# Patient Record
Sex: FEMALE | Race: WHITE | Employment: PART TIME | URBAN - NONMETROPOLITAN AREA
[De-identification: names, ages, dates, MRNs, and addresses within clinical notes are randomized per-mention and may not be internally consistent; named-entity substitution may affect disease eponyms.]

---

## 2017-01-09 ENCOUNTER — OFFICE VISIT (OUTPATIENT)
Dept: FAMILY MEDICINE CLINIC | Age: 47
End: 2017-01-09

## 2017-01-09 VITALS
SYSTOLIC BLOOD PRESSURE: 118 MMHG | WEIGHT: 136 LBS | BODY MASS INDEX: 21.86 KG/M2 | HEIGHT: 66 IN | DIASTOLIC BLOOD PRESSURE: 72 MMHG | HEART RATE: 68 BPM | RESPIRATION RATE: 16 BRPM

## 2017-01-09 DIAGNOSIS — R20.2 LEFT HAND PARESTHESIA: Primary | ICD-10-CM

## 2017-01-09 DIAGNOSIS — Z12.31 ENCOUNTER FOR SCREENING MAMMOGRAM FOR MALIGNANT NEOPLASM OF BREAST: ICD-10-CM

## 2017-01-09 PROCEDURE — 99213 OFFICE O/P EST LOW 20 MIN: CPT | Performed by: FAMILY MEDICINE

## 2017-01-09 RX ORDER — M-VIT,TX,IRON,MINS/CALC/FOLIC 27MG-0.4MG
1 TABLET ORAL DAILY
Status: ON HOLD | COMMUNITY
End: 2021-04-22 | Stop reason: HOSPADM

## 2017-01-09 ASSESSMENT — ENCOUNTER SYMPTOMS
RESPIRATORY NEGATIVE: 1
GASTROINTESTINAL NEGATIVE: 1

## 2017-01-10 ENCOUNTER — TELEPHONE (OUTPATIENT)
Dept: FAMILY MEDICINE CLINIC | Age: 47
End: 2017-01-10

## 2017-01-10 ENCOUNTER — PROCEDURE VISIT (OUTPATIENT)
Dept: PHYSICAL MEDICINE AND REHAB | Age: 47
End: 2017-01-10

## 2017-01-10 DIAGNOSIS — M54.12 LEFT CERVICAL RADICULOPATHY: Primary | ICD-10-CM

## 2017-01-10 DIAGNOSIS — R20.2 LEFT HAND PARESTHESIA: ICD-10-CM

## 2017-01-10 DIAGNOSIS — R20.0 LEFT ARM NUMBNESS: ICD-10-CM

## 2017-01-10 PROCEDURE — 95909 NRV CNDJ TST 5-6 STUDIES: CPT | Performed by: PSYCHIATRY & NEUROLOGY

## 2017-01-10 PROCEDURE — 95886 MUSC TEST DONE W/N TEST COMP: CPT | Performed by: PSYCHIATRY & NEUROLOGY

## 2017-02-02 ENCOUNTER — TELEPHONE (OUTPATIENT)
Dept: FAMILY MEDICINE CLINIC | Age: 47
End: 2017-02-02

## 2017-08-23 ENCOUNTER — TELEPHONE (OUTPATIENT)
Dept: FAMILY MEDICINE CLINIC | Age: 47
End: 2017-08-23

## 2017-08-23 DIAGNOSIS — R20.0 LEFT ARM NUMBNESS: Primary | ICD-10-CM

## 2017-10-02 ENCOUNTER — TELEPHONE (OUTPATIENT)
Dept: FAMILY MEDICINE CLINIC | Age: 47
End: 2017-10-02

## 2017-10-02 NOTE — TELEPHONE ENCOUNTER
----- Message from Melissa Mata MD sent at 10/2/2017  8:21 AM EDT -----  [facility] labs show that her cholesterol is improved from previous. Continue diet and exercise. Remainder of labs look great. Continue current.  CG

## 2017-10-17 ENCOUNTER — APPOINTMENT (OUTPATIENT)
Dept: WOMENS IMAGING | Age: 47
End: 2017-10-17
Payer: COMMERCIAL

## 2017-10-17 ENCOUNTER — HOSPITAL ENCOUNTER (OUTPATIENT)
Dept: WOMENS IMAGING | Age: 47
Discharge: HOME | End: 2017-10-17
Payer: COMMERCIAL

## 2017-10-17 DIAGNOSIS — N63.0 BREAST MASS: ICD-10-CM

## 2017-10-17 PROCEDURE — G0279 TOMOSYNTHESIS, MAMMO: HCPCS

## 2017-10-24 RX ORDER — TURMERIC ROOT EXTRACT 500 MG
1 TABLET ORAL 2 TIMES DAILY
Status: ON HOLD | COMMUNITY
End: 2021-04-22 | Stop reason: HOSPADM

## 2017-10-24 RX ORDER — ACETAMINOPHEN 160 MG
1 TABLET,DISINTEGRATING ORAL DAILY
COMMUNITY

## 2017-10-24 NOTE — PROGRESS NOTES
NPO after midnight  [employer] and drivers license  Wear comfortable clean clothing  Do not bring jewelry or valuables  Shower night before and morning of surgery with a liquid antibacterial soap  Bring list of medications with dosage and how often taken  Follow all instructions given by your physician   needed at discharge    In preparation for their surgical procedure above patient was screened for Obstructive Sleep Apnea (EVAN) using the STOP-Bang Questionnaire by the [place] [address] department. This is a pre-surgical screening tool for patient safety and serves as a recommendation, this WILL NOT cause cancellation of surgery. STOP-Bang Questionnaire  * Do you currently see a pulmonologist?  No     If yes STOP, do not complete. Patient follows with Dr. .    1. Do you snore loudly (able to be heard in the next room)? No    2. Do you often feel tired or sleepy during the daytime? No       3. Has anyone ever told you that you stop breathing during your sleep? No    4. Do you have or are you being treated for high blood pressure? No      5. BMI more than 35? BMI (Calculated): 24        No    6. Age over 48 years? 48 y.o. No    7. Neck Circumference greater than 17 inches for male or 16 inches for female? Measured           (visits only)            Not Applicable    8. Gender Male? No      TOTAL SCORE: 0    EVAN - Low Risk : Yes to 0 - 2 questions  EVAN - Intermediate Risk : Yes to 3 - 4 questions  EVAN - High Risk : Yes to 5 - 8 questions    Adapted from:   STOP Questionnaire: A Tool to Screen Patients for Obstructive Sleep Apnea   [employer] [facility] FLAVIA Rodríguez.B.S., Vandana Queen M.D., [facility]. Kunal Boyd, Ph.D., CHICHI BurnsB.B.S., [place] Joshua Marcos M.D., [facility]. [employer] [facility]    Anesthesiology 2008; 620:217-75 Copyright 2008, the [address] [employer]

## 2017-10-31 ENCOUNTER — ANESTHESIA (OUTPATIENT)
Dept: OPERATING ROOM | Age: 47
End: 2017-10-31
Payer: COMMERCIAL

## 2017-10-31 ENCOUNTER — HOSPITAL ENCOUNTER (OUTPATIENT)
Age: 47
Setting detail: OUTPATIENT SURGERY
Discharge: HOME | End: 2017-10-31
Attending: OBSTETRICS & GYNECOLOGY | Admitting: OBSTETRICS & GYNECOLOGY
Payer: COMMERCIAL

## 2017-10-31 ENCOUNTER — ANESTHESIA EVENT (OUTPATIENT)
Dept: OPERATING ROOM | Age: 47
End: 2017-10-31
Payer: COMMERCIAL

## 2017-10-31 VITALS
SYSTOLIC BLOOD PRESSURE: 121 MMHG | OXYGEN SATURATION: 100 % | BODY MASS INDEX: 24.26 KG/M2 | HEIGHT: 65 IN | WEIGHT: 145.6 LBS | TEMPERATURE: 98.2 F | HEART RATE: 71 BPM | DIASTOLIC BLOOD PRESSURE: 80 MMHG | RESPIRATION RATE: 20 BRPM

## 2017-10-31 VITALS — SYSTOLIC BLOOD PRESSURE: 92 MMHG | OXYGEN SATURATION: 99 % | DIASTOLIC BLOOD PRESSURE: 43 MMHG

## 2017-10-31 LAB — PREGNANCY, URINE: NEGATIVE

## 2017-10-31 PROCEDURE — 7100000000 HC PACU RECOVERY - FIRST 15 MIN: Performed by: OBSTETRICS & GYNECOLOGY

## 2017-10-31 PROCEDURE — 3600000013 HC SURGERY LEVEL 3 ADDTL 15MIN: Performed by: OBSTETRICS & GYNECOLOGY

## 2017-10-31 PROCEDURE — 7100000001 HC PACU RECOVERY - ADDTL 15 MIN: Performed by: OBSTETRICS & GYNECOLOGY

## 2017-10-31 PROCEDURE — 2720000010 HC SURG SUPPLY STERILE: Performed by: OBSTETRICS & GYNECOLOGY

## 2017-10-31 PROCEDURE — 6360000002 HC RX W HCPCS: Performed by: OBSTETRICS & GYNECOLOGY

## 2017-10-31 PROCEDURE — 2500000003 HC RX 250 WO HCPCS: Performed by: NURSE ANESTHETIST, CERTIFIED REGISTERED

## 2017-10-31 PROCEDURE — 81025 URINE PREGNANCY TEST: CPT

## 2017-10-31 PROCEDURE — 3600000003 HC SURGERY LEVEL 3 BASE: Performed by: OBSTETRICS & GYNECOLOGY

## 2017-10-31 PROCEDURE — 2580000003 HC RX 258: Performed by: NURSE ANESTHETIST, CERTIFIED REGISTERED

## 2017-10-31 PROCEDURE — 3700000001 HC ADD 15 MINUTES (ANESTHESIA): Performed by: OBSTETRICS & GYNECOLOGY

## 2017-10-31 PROCEDURE — 7100000010 HC PHASE II RECOVERY - FIRST 15 MIN: Performed by: OBSTETRICS & GYNECOLOGY

## 2017-10-31 PROCEDURE — 7100000011 HC PHASE II RECOVERY - ADDTL 15 MIN: Performed by: OBSTETRICS & GYNECOLOGY

## 2017-10-31 PROCEDURE — C1758 CATHETER, URETERAL: HCPCS | Performed by: OBSTETRICS & GYNECOLOGY

## 2017-10-31 PROCEDURE — 6370000000 HC RX 637 (ALT 250 FOR IP): Performed by: OBSTETRICS & GYNECOLOGY

## 2017-10-31 PROCEDURE — 6360000002 HC RX W HCPCS: Performed by: NURSE ANESTHETIST, CERTIFIED REGISTERED

## 2017-10-31 PROCEDURE — A6252 ABSORPT DRG >16 <=48 W/O BDR: HCPCS | Performed by: OBSTETRICS & GYNECOLOGY

## 2017-10-31 PROCEDURE — 88305 TISSUE EXAM BY PATHOLOGIST: CPT

## 2017-10-31 PROCEDURE — 3700000000 HC ANESTHESIA ATTENDED CARE: Performed by: OBSTETRICS & GYNECOLOGY

## 2017-10-31 RX ORDER — MORPHINE SULFATE 2 MG/ML
2 INJECTION, SOLUTION INTRAMUSCULAR; INTRAVENOUS
Status: DISCONTINUED | OUTPATIENT
Start: 2017-10-31 | End: 2017-10-31 | Stop reason: HOSPADM

## 2017-10-31 RX ORDER — HYDROCODONE BITARTRATE AND ACETAMINOPHEN 5; 325 MG/1; MG/1
2 TABLET ORAL EVERY 4 HOURS PRN
Status: DISCONTINUED | OUTPATIENT
Start: 2017-10-31 | End: 2017-10-31 | Stop reason: HOSPADM

## 2017-10-31 RX ORDER — ONDANSETRON 2 MG/ML
8 INJECTION INTRAMUSCULAR; INTRAVENOUS EVERY 8 HOURS PRN
Status: DISCONTINUED | OUTPATIENT
Start: 2017-10-31 | End: 2017-10-31 | Stop reason: HOSPADM

## 2017-10-31 RX ORDER — SODIUM CHLORIDE, SODIUM LACTATE, POTASSIUM CHLORIDE, CALCIUM CHLORIDE 600; 310; 30; 20 MG/100ML; MG/100ML; MG/100ML; MG/100ML
INJECTION, SOLUTION INTRAVENOUS SEE ADMIN INSTRUCTIONS
Status: DISCONTINUED | OUTPATIENT
Start: 2017-10-31 | End: 2017-10-31 | Stop reason: HOSPADM

## 2017-10-31 RX ORDER — HYDROCODONE BITARTRATE AND ACETAMINOPHEN 5; 325 MG/1; MG/1
1-2 TABLET ORAL EVERY 6 HOURS PRN
Qty: 15 TABLET | Refills: 0 | Status: SHIPPED | OUTPATIENT
Start: 2017-10-31 | End: 2017-11-07

## 2017-10-31 RX ORDER — MIDAZOLAM HYDROCHLORIDE 1 MG/ML
INJECTION INTRAMUSCULAR; INTRAVENOUS PRN
Status: DISCONTINUED | OUTPATIENT
Start: 2017-10-31 | End: 2017-10-31 | Stop reason: SDUPTHER

## 2017-10-31 RX ORDER — DEXAMETHASONE SODIUM PHOSPHATE 4 MG/ML
INJECTION, SOLUTION INTRA-ARTICULAR; INTRALESIONAL; INTRAMUSCULAR; INTRAVENOUS; SOFT TISSUE PRN
Status: DISCONTINUED | OUTPATIENT
Start: 2017-10-31 | End: 2017-10-31 | Stop reason: SDUPTHER

## 2017-10-31 RX ORDER — LIDOCAINE HYDROCHLORIDE 20 MG/ML
INJECTION, SOLUTION INFILTRATION; PERINEURAL PRN
Status: DISCONTINUED | OUTPATIENT
Start: 2017-10-31 | End: 2017-10-31 | Stop reason: SDUPTHER

## 2017-10-31 RX ORDER — KETOROLAC TROMETHAMINE 30 MG/ML
30 INJECTION, SOLUTION INTRAMUSCULAR; INTRAVENOUS ONCE
Status: COMPLETED | OUTPATIENT
Start: 2017-10-31 | End: 2017-10-31

## 2017-10-31 RX ORDER — SODIUM CHLORIDE 9 MG/ML
INJECTION, SOLUTION INTRAVENOUS CONTINUOUS PRN
Status: DISCONTINUED | OUTPATIENT
Start: 2017-10-31 | End: 2017-10-31 | Stop reason: SDUPTHER

## 2017-10-31 RX ORDER — HYDROCODONE BITARTRATE AND ACETAMINOPHEN 5; 325 MG/1; MG/1
1 TABLET ORAL EVERY 4 HOURS PRN
Status: DISCONTINUED | OUTPATIENT
Start: 2017-10-31 | End: 2017-10-31 | Stop reason: HOSPADM

## 2017-10-31 RX ORDER — FENTANYL CITRATE 50 UG/ML
INJECTION, SOLUTION INTRAMUSCULAR; INTRAVENOUS PRN
Status: DISCONTINUED | OUTPATIENT
Start: 2017-10-31 | End: 2017-10-31 | Stop reason: SDUPTHER

## 2017-10-31 RX ORDER — PROPOFOL 10 MG/ML
INJECTION, EMULSION INTRAVENOUS PRN
Status: DISCONTINUED | OUTPATIENT
Start: 2017-10-31 | End: 2017-10-31 | Stop reason: SDUPTHER

## 2017-10-31 RX ORDER — MORPHINE SULFATE 2 MG/ML
4 INJECTION, SOLUTION INTRAMUSCULAR; INTRAVENOUS
Status: DISCONTINUED | OUTPATIENT
Start: 2017-10-31 | End: 2017-10-31 | Stop reason: HOSPADM

## 2017-10-31 RX ORDER — IBUPROFEN 800 MG/1
800 TABLET ORAL EVERY 8 HOURS PRN
Status: DISCONTINUED | OUTPATIENT
Start: 2017-10-31 | End: 2017-10-31 | Stop reason: HOSPADM

## 2017-10-31 RX ORDER — SODIUM CHLORIDE, SODIUM LACTATE, POTASSIUM CHLORIDE, CALCIUM CHLORIDE 600; 310; 30; 20 MG/100ML; MG/100ML; MG/100ML; MG/100ML
INJECTION, SOLUTION INTRAVENOUS CONTINUOUS
Status: DISCONTINUED | OUTPATIENT
Start: 2017-10-31 | End: 2017-10-31 | Stop reason: HOSPADM

## 2017-10-31 RX ORDER — ACETAMINOPHEN 325 MG/1
650 TABLET ORAL EVERY 4 HOURS PRN
Status: DISCONTINUED | OUTPATIENT
Start: 2017-10-31 | End: 2017-10-31 | Stop reason: HOSPADM

## 2017-10-31 RX ADMIN — ONDANSETRON 4 MG: 2 INJECTION INTRAMUSCULAR; INTRAVENOUS at 07:47

## 2017-10-31 RX ADMIN — MIDAZOLAM HYDROCHLORIDE 2 MG: 1 INJECTION, SOLUTION INTRAMUSCULAR; INTRAVENOUS at 07:35

## 2017-10-31 RX ADMIN — LIDOCAINE HYDROCHLORIDE 100 MG: 20 INJECTION, SOLUTION INFILTRATION; PERINEURAL at 07:35

## 2017-10-31 RX ADMIN — KETOROLAC TROMETHAMINE 30 MG: 30 INJECTION, SOLUTION INTRAMUSCULAR at 07:05

## 2017-10-31 RX ADMIN — FENTANYL CITRATE 50 MCG: 50 INJECTION INTRAMUSCULAR; INTRAVENOUS at 07:47

## 2017-10-31 RX ADMIN — PROPOFOL 200 MG: 10 INJECTION, EMULSION INTRAVENOUS at 07:38

## 2017-10-31 RX ADMIN — HYDROCODONE BITARTRATE AND ACETAMINOPHEN 1 TABLET: 5; 325 TABLET ORAL at 09:10

## 2017-10-31 RX ADMIN — FENTANYL CITRATE 50 MCG: 50 INJECTION INTRAMUSCULAR; INTRAVENOUS at 08:16

## 2017-10-31 RX ADMIN — HYDROCODONE BITARTRATE AND ACETAMINOPHEN 1 TABLET: 5; 325 TABLET ORAL at 09:29

## 2017-10-31 RX ADMIN — SODIUM CHLORIDE: 9 INJECTION, SOLUTION INTRAVENOUS at 07:35

## 2017-10-31 RX ADMIN — ONDANSETRON 4 MG: 2 INJECTION INTRAMUSCULAR; INTRAVENOUS at 09:33

## 2017-10-31 RX ADMIN — DEXAMETHASONE SODIUM PHOSPHATE 8 MG: 4 INJECTION, SOLUTION INTRAMUSCULAR; INTRAVENOUS at 07:47

## 2017-10-31 ASSESSMENT — PULMONARY FUNCTION TESTS
PIF_VALUE: 2
PIF_VALUE: 12
PIF_VALUE: 15
PIF_VALUE: 0
PIF_VALUE: 13
PIF_VALUE: 8
PIF_VALUE: 2
PIF_VALUE: 3
PIF_VALUE: 3
PIF_VALUE: 0
PIF_VALUE: 2
PIF_VALUE: 1
PIF_VALUE: 1
PIF_VALUE: 12
PIF_VALUE: 6
PIF_VALUE: 13
PIF_VALUE: 1
PIF_VALUE: 5
PIF_VALUE: 1
PIF_VALUE: 3
PIF_VALUE: 1
PIF_VALUE: 3
PIF_VALUE: 2
PIF_VALUE: 0
PIF_VALUE: 1
PIF_VALUE: 12
PIF_VALUE: 2
PIF_VALUE: 3
PIF_VALUE: 0
PIF_VALUE: 3
PIF_VALUE: 1
PIF_VALUE: 3
PIF_VALUE: 2
PIF_VALUE: 3
PIF_VALUE: 3
PIF_VALUE: 13
PIF_VALUE: 2
PIF_VALUE: 2
PIF_VALUE: 12
PIF_VALUE: 0
PIF_VALUE: 14
PIF_VALUE: 3
PIF_VALUE: 1
PIF_VALUE: 1
PIF_VALUE: 12
PIF_VALUE: 0
PIF_VALUE: 2
PIF_VALUE: 14
PIF_VALUE: 11
PIF_VALUE: 3
PIF_VALUE: 0
PIF_VALUE: 2
PIF_VALUE: 10
PIF_VALUE: 3
PIF_VALUE: 0
PIF_VALUE: 0
PIF_VALUE: 3
PIF_VALUE: 3
PIF_VALUE: 1

## 2017-10-31 ASSESSMENT — PAIN SCALES - GENERAL
PAINLEVEL_OUTOF10: 6
PAINLEVEL_OUTOF10: 6
PAINLEVEL_OUTOF10: 0

## 2017-10-31 ASSESSMENT — PAIN - FUNCTIONAL ASSESSMENT: PAIN_FUNCTIONAL_ASSESSMENT: 0-10

## 2017-10-31 NOTE — ANESTHESIA PRE PROCEDURE
kg) 145 lb 9.6 oz (66 kg)   Height: 5' 5\" (1.651 m) 5' 5\" (1.651 m)                                              BP Readings from Last 3 Encounters:   10/31/17 126/74   02/01/17 129/65   01/09/17 118/72       NPO Status: Time of last liquid consumption: 2000                        Time of last solid consumption: 1830                        Date of last liquid consumption: 10/30/17                        Date of last solid food consumption: 10/30/17    BMI:   Wt Readings from Last 3 Encounters:   10/31/17 145 lb 9.6 oz (66 kg)   01/09/17 136 lb (61.7 kg)   05/21/15 140 lb (63.5 kg)     Body mass index is 24.23 kg/m². CBC:   Lab Results   Component Value Date    WBC 6.6 09/30/2017    RBC 4.57 09/30/2017    HGB 12.5 09/30/2017    HCT 37.4 09/30/2017    MCV 81.8 09/30/2017    RDW 14.5 09/30/2017     09/30/2017       CMP:   Lab Results   Component Value Date     09/30/2017    K 4.5 09/30/2017     09/30/2017    CO2 24 09/30/2017    BUN 12 09/30/2017    CREATININE 0.77 09/30/2017    AGRATIO 1.5 09/30/2017    GLUCOSE 84 09/30/2017    PROT 7.7 09/30/2017    CALCIUM 9.2 09/30/2017    BILITOT 0.7 09/30/2017    ALKPHOS 73 09/30/2017    AST 21 09/30/2017    ALT 12 09/30/2017       POC Tests: No results for input(s): POCGLU, POCNA, POCK, POCCL, POCBUN, POCHEMO, POCHCT in the last 72 hours.     Coags: No results found for: PROTIME, INR, APTT    HCG (If Applicable): No results found for: PREGTESTUR, PREGSERUM, HCG, HCGQUANT     ABGs: No results found for: PHART, PO2ART, QSO2JVX, FFJ2ZSQ, BEART, U1KHNFOM     Type & Screen (If Applicable):  No results found for: LABABO, [address]    Anesthesia Evaluation  Patient summary reviewed and Nursing notes reviewed no history of anesthetic complications:   Airway: Mallampati: II  TM distance: >3 FB   Neck ROM: full  Mouth opening: > = 3 FB Dental:          Pulmonary: breath sounds clear to auscultation                             Cardiovascular:            Rhythm: regular  Rate: normal                    Neuro/Psych:               GI/Hepatic/Renal:             Endo/Other:                     Abdominal:       Abdomen: soft. Vascular:                                      Anesthesia Plan      general     ASA 1       Induction: intravenous. MIPS: Postoperative opioids intended and Prophylactic antiemetics administered. Anesthetic plan and risks discussed with patient.       Plan discussed with CRNA, attending and surgical team.                  Dori Powers DO   10/31/2017

## 2017-10-31 NOTE — BRIEF OP NOTE
Brief Operative Report      Pre-operative Diagnosis:  menorrhagia    Post-operative Diagnosis:  Same    Procedure:  Hysteroscopy, D&C, HTA     Surgeon: HARMEET Watkins MD     Anesthesia:  General endotrachial anesthesia    Estimated blood loss:  Minimal     Findings: See Operative Dictation, Sounding length 8cm, cervical length 4cm, Arcuate shaped uterus    Complications:  none      See dictated operative report for full details.       [address] [place] [address]

## 2017-10-31 NOTE — PROGRESS NOTES
0836-Patient arrived to PACU via cart. Report received from [facility] and [employer]. Patient non-arousable at this time. Attached to monitor, vitals stable. Respirations even and unlabored. LMA remains in place. Patient assessed. Donna pad in place. No drainage noted. 0841-Patient opening eyes. Nods yes and no on command. LMA removed. Repsirations remain even and unlabored on room air. Charlene paw gown attached and SCD's attached to machine. 0845-Patient continues to be drowsy but able to respond to questions. Denies pain and nausea at this time. Denies being cold or to warm. Educated on use of charlene paw gown and the ability to regulate temperature. 0850-Patient arouse to name. Patient continues to deny pain and nausea. Vitals remain stable with respirations even and unlabored. 0855-Patient complains of cramping. States pain is \"3\"/10. Offered pain medication but patient declined at this time. Will reassess pain. 0905-Patient meets criteria to move to phase II. Patient awake and alert. 0908-patient in Phase II Patient provided with drink and snack.  brought to bedside. Patient rates pain \"6\"/10.   0910-Patient tolerating snack and drink. Patient medicated with Norco per order. Instructed on call light use.  0925-Patient continues to complain of pain \"6\"/10. Will medicate with second tablet of norco.  0937-Patient medicated with 1 tablet of norco per order. Patient also complains of nausea. Medicated with zofran as ordered. 0950-Patient resting with eyes closed.  remains at bedside. Patient states nausea is better but pain remains present. Will continue to reassess. 1000-Patient ambulated to bathroom with assistance.  remains in bathroom with patient. 1010-Patient ambulated back to bed. Patient state pain is improved slightly and nausea is improved. Patient requesting to go home. IV removed with no complications.   1015-Discharge instructions reviewed and verbalized understanding. 1025-Patient discharged in stable condition with .

## 2017-10-31 NOTE — OP NOTE
and  needle counts were correct x2. Four-quadrant curettage was performed  prior to the ablation that was sent to the pathology for evaluation.         Heena Angel M.D.    D: 10/31/2017 11:01:48       T: 10/31/2017 11:34:19     [email or web address]  Job#: [id number]     Doc#: [id number]

## 2018-02-08 ENCOUNTER — OFFICE VISIT (OUTPATIENT)
Dept: FAMILY MEDICINE CLINIC | Age: 48
End: 2018-02-08
Payer: COMMERCIAL

## 2018-02-08 VITALS
RESPIRATION RATE: 12 BRPM | HEART RATE: 68 BPM | BODY MASS INDEX: 23.3 KG/M2 | WEIGHT: 140 LBS | DIASTOLIC BLOOD PRESSURE: 64 MMHG | SYSTOLIC BLOOD PRESSURE: 108 MMHG

## 2018-02-08 DIAGNOSIS — R20.0 NUMBNESS OF ARM: Primary | ICD-10-CM

## 2018-02-08 DIAGNOSIS — M54.2 NECK PAIN: ICD-10-CM

## 2018-02-08 DIAGNOSIS — M54.12 CERVICAL RADICULOPATHY: ICD-10-CM

## 2018-02-08 PROCEDURE — G8427 DOCREV CUR MEDS BY ELIG CLIN: HCPCS | Performed by: FAMILY MEDICINE

## 2018-02-08 PROCEDURE — 1036F TOBACCO NON-USER: CPT | Performed by: FAMILY MEDICINE

## 2018-02-08 PROCEDURE — G8482 FLU IMMUNIZE ORDER/ADMIN: HCPCS | Performed by: FAMILY MEDICINE

## 2018-02-08 PROCEDURE — G8420 CALC BMI NORM PARAMETERS: HCPCS | Performed by: FAMILY MEDICINE

## 2018-02-08 PROCEDURE — 99213 OFFICE O/P EST LOW 20 MIN: CPT | Performed by: FAMILY MEDICINE

## 2018-02-08 RX ORDER — PREDNISONE 10 MG/1
TABLET ORAL
Qty: 30 TABLET | Refills: 0 | Status: SHIPPED | OUTPATIENT
Start: 2018-02-08 | End: 2018-02-18

## 2018-02-08 ASSESSMENT — PATIENT HEALTH QUESTIONNAIRE - PHQ9
SUM OF ALL RESPONSES TO PHQ QUESTIONS 1-9: 0
SUM OF ALL RESPONSES TO PHQ9 QUESTIONS 1 & 2: 0
1. LITTLE INTEREST OR PLEASURE IN DOING THINGS: 0
2. FEELING DOWN, DEPRESSED OR HOPELESS: 0

## 2018-02-08 ASSESSMENT — ENCOUNTER SYMPTOMS
GASTROINTESTINAL NEGATIVE: 1
RESPIRATORY NEGATIVE: 1

## 2018-02-08 NOTE — PROGRESS NOTES
(DELTASONE) 10 MG tablet 30 tablet 0     Sig: Take 4 po qd x 3 days, then 3 po qd x 3 days, then 2 po qd x 3 days, then 1 po qd x 3 days. She has failed 2 years of conservative therapy at this point. Will proceed with MRI.     Orders Placed This Encounter   Procedures    MRI CERVICAL SPINE WO CONTRAST     Standing Status:   Future     Standing Expiration Date:   2/8/2019     Order Specific Question:   Reason for exam:     Answer:   cervical radiculopathy; left arm numbness x 2 years         Follow up if not better            Electronically signed by Troy Mcgee MD on 2/8/2018 at 8:44 AM

## 2018-02-14 ENCOUNTER — TELEPHONE (OUTPATIENT)
Dept: FAMILY MEDICINE CLINIC | Age: 48
End: 2018-02-14

## 2018-02-14 DIAGNOSIS — M54.2 NECK PAIN: Primary | ICD-10-CM

## 2018-02-26 ENCOUNTER — TELEPHONE (OUTPATIENT)
Dept: FAMILY MEDICINE CLINIC | Age: 48
End: 2018-02-26

## 2018-02-26 RX ORDER — NAPROXEN 500 MG/1
500 TABLET ORAL 2 TIMES DAILY WITH MEALS
Qty: 30 TABLET | Refills: 1 | Status: ON HOLD | OUTPATIENT
Start: 2018-02-26 | End: 2021-04-22 | Stop reason: HOSPADM

## 2018-03-26 ENCOUNTER — NURSE ONLY (OUTPATIENT)
Dept: FAMILY MEDICINE CLINIC | Age: 48
End: 2018-03-26
Payer: COMMERCIAL

## 2018-03-26 DIAGNOSIS — L50.9 HIVES: Primary | ICD-10-CM

## 2018-03-26 PROCEDURE — 96372 THER/PROPH/DIAG INJ SC/IM: CPT | Performed by: FAMILY MEDICINE

## 2018-03-26 RX ORDER — METHYLPREDNISOLONE ACETATE 80 MG/ML
80 INJECTION, SUSPENSION INTRA-ARTICULAR; INTRALESIONAL; INTRAMUSCULAR; SOFT TISSUE ONCE
Status: COMPLETED | OUTPATIENT
Start: 2018-03-26 | End: 2018-03-26

## 2018-03-26 RX ADMIN — METHYLPREDNISOLONE ACETATE 80 MG: 80 INJECTION, SUSPENSION INTRA-ARTICULAR; INTRALESIONAL; INTRAMUSCULAR; SOFT TISSUE at 13:43

## 2018-03-26 NOTE — PROGRESS NOTES
After obtaining consent, and per verbal orders of Dr. Lilliam Bobby, injection of Depo-Medrol 80 mg/mL given in left upper quad. gluteus by Euna Odor. Patient tolerated well.

## 2018-04-06 ENCOUNTER — HOSPITAL ENCOUNTER (OUTPATIENT)
Dept: GENERAL RADIOLOGY | Age: 48
Discharge: HOME | End: 2018-04-06
Payer: COMMERCIAL

## 2018-04-06 ENCOUNTER — HOSPITAL ENCOUNTER (OUTPATIENT)
Age: 48
Discharge: HOME | End: 2018-04-06
Payer: COMMERCIAL

## 2018-04-06 DIAGNOSIS — M47.812 OSTEOARTHRITIS OF CERVICAL SPINE, UNSPECIFIED SPINAL OSTEOARTHRITIS COMPLICATION STATUS: ICD-10-CM

## 2018-04-06 DIAGNOSIS — M48.02 CERVICAL SPINAL STENOSIS: ICD-10-CM

## 2018-04-06 LAB
EKG ATRIAL RATE: 61 BPM
EKG P AXIS: 23 DEGREES
EKG P-R INTERVAL: 128 MS
EKG Q-T INTERVAL: 414 MS
EKG QRS DURATION: 88 MS
EKG QTC CALCULATION (BAZETT): 416 MS
EKG R AXIS: 57 DEGREES
EKG T AXIS: 55 DEGREES
EKG VENTRICULAR RATE: 61 BPM

## 2018-04-06 PROCEDURE — 93010 ELECTROCARDIOGRAM REPORT: CPT | Performed by: NUCLEAR MEDICINE

## 2018-04-06 PROCEDURE — 71046 X-RAY EXAM CHEST 2 VIEWS: CPT

## 2018-04-06 PROCEDURE — 93005 ELECTROCARDIOGRAM TRACING: CPT | Performed by: ORTHOPAEDIC SURGERY

## 2018-04-12 ENCOUNTER — OFFICE VISIT (OUTPATIENT)
Dept: FAMILY MEDICINE CLINIC | Age: 48
End: 2018-04-12
Payer: COMMERCIAL

## 2018-04-12 VITALS
WEIGHT: 145 LBS | RESPIRATION RATE: 12 BRPM | SYSTOLIC BLOOD PRESSURE: 132 MMHG | BODY MASS INDEX: 23.3 KG/M2 | HEART RATE: 64 BPM | DIASTOLIC BLOOD PRESSURE: 74 MMHG | HEIGHT: 66 IN | OXYGEN SATURATION: 98 % | TEMPERATURE: 98.1 F

## 2018-04-12 DIAGNOSIS — Z01.818 PREOP EXAMINATION: ICD-10-CM

## 2018-04-12 DIAGNOSIS — M48.02 FORAMINAL STENOSIS OF CERVICAL REGION: Primary | ICD-10-CM

## 2018-04-12 PROCEDURE — G8427 DOCREV CUR MEDS BY ELIG CLIN: HCPCS | Performed by: FAMILY MEDICINE

## 2018-04-12 PROCEDURE — 99243 OFF/OP CNSLTJ NEW/EST LOW 30: CPT | Performed by: FAMILY MEDICINE

## 2018-04-12 PROCEDURE — G8420 CALC BMI NORM PARAMETERS: HCPCS | Performed by: FAMILY MEDICINE

## 2018-04-12 ASSESSMENT — ENCOUNTER SYMPTOMS
ABDOMINAL PAIN: 0
DIARRHEA: 0
VOMITING: 0
SHORTNESS OF BREATH: 0
NAUSEA: 0
EYES NEGATIVE: 1
BLOOD IN STOOL: 0

## 2018-07-25 ENCOUNTER — TELEPHONE (OUTPATIENT)
Dept: FAMILY MEDICINE CLINIC | Age: 48
End: 2018-07-25

## 2018-07-25 DIAGNOSIS — Z12.39 SCREENING FOR MALIGNANT NEOPLASM OF BREAST: Primary | ICD-10-CM

## 2018-07-25 NOTE — TELEPHONE ENCOUNTER
Pt requesting annual screening mammogram.  Mammogram scheduled [facility] 7/31/2018 9:40 am.  Please arrive 15 minutes early, no powder, deodorant or lotion day of test. Pt notified of appointment day and time.

## 2018-08-10 ENCOUNTER — HOSPITAL ENCOUNTER (OUTPATIENT)
Dept: MAMMOGRAPHY | Age: 48
Discharge: HOME | End: 2018-08-10
Payer: COMMERCIAL

## 2018-08-10 DIAGNOSIS — Z12.39 SCREENING FOR MALIGNANT NEOPLASM OF BREAST: ICD-10-CM

## 2018-08-10 DIAGNOSIS — Z12.39 BREAST SCREENING: ICD-10-CM

## 2018-08-10 PROCEDURE — 77067 SCR MAMMO BI INCL CAD: CPT

## 2018-10-01 ENCOUNTER — TELEPHONE (OUTPATIENT)
Dept: FAMILY MEDICINE CLINIC | Age: 48
End: 2018-10-01

## 2018-10-01 NOTE — TELEPHONE ENCOUNTER
Andreina Stallworth MD  [facility] Clinical Support Pool             Notify her that her community screening labs show that cholesterol is worse than last year but she does not fit into a statin benefit group.  Work on diet and exercise for cholesterol.  The remainder of the labs look ok. CG      Pt notified, she voiced understanding and had no further questions.

## 2019-10-02 ENCOUNTER — TELEPHONE (OUTPATIENT)
Dept: FAMILY MEDICINE CLINIC | Age: 49
End: 2019-10-02

## 2020-12-16 ENCOUNTER — NURSE ONLY (OUTPATIENT)
Dept: LAB | Age: 50
End: 2020-12-16

## 2021-01-08 ENCOUNTER — HOSPITAL ENCOUNTER (OUTPATIENT)
Dept: MAMMOGRAPHY | Age: 51
Discharge: HOME | End: 2021-01-08
Payer: COMMERCIAL

## 2021-01-08 DIAGNOSIS — Z12.31 VISIT FOR SCREENING MAMMOGRAM: ICD-10-CM

## 2021-01-08 PROCEDURE — 77063 BREAST TOMOSYNTHESIS BI: CPT

## 2021-02-08 ENCOUNTER — TELEPHONE (OUTPATIENT)
Dept: FAMILY MEDICINE CLINIC | Age: 51
End: 2021-02-08

## 2021-02-08 ENCOUNTER — HOSPITAL ENCOUNTER (OUTPATIENT)
Dept: CT IMAGING | Age: 51
Discharge: HOME | End: 2021-02-08
Payer: COMMERCIAL

## 2021-02-08 ENCOUNTER — OFFICE VISIT (OUTPATIENT)
Dept: FAMILY MEDICINE CLINIC | Age: 51
End: 2021-02-08
Payer: COMMERCIAL

## 2021-02-08 VITALS
DIASTOLIC BLOOD PRESSURE: 84 MMHG | SYSTOLIC BLOOD PRESSURE: 126 MMHG | HEART RATE: 84 BPM | RESPIRATION RATE: 16 BRPM | TEMPERATURE: 98 F

## 2021-02-08 DIAGNOSIS — R10.9 ACUTE LEFT FLANK PAIN: ICD-10-CM

## 2021-02-08 DIAGNOSIS — R10.9 ACUTE LEFT FLANK PAIN: Primary | ICD-10-CM

## 2021-02-08 DIAGNOSIS — R31.9 HEMATURIA, UNSPECIFIED TYPE: ICD-10-CM

## 2021-02-08 LAB
BILIRUBIN URINE: ABNORMAL
BLOOD URINE, POC: ABNORMAL
CHARACTER, URINE: CLEAR
COLOR, URINE: ABNORMAL
GLUCOSE URINE: NEGATIVE MG/DL
KETONES, URINE: 80
LEUKOCYTE CLUMPS, URINE: ABNORMAL
NITRITE, URINE: NEGATIVE
PH, URINE: 5.5 (ref 5–9)
PROTEIN, URINE: 30 MG/DL
SPECIFIC GRAVITY, URINE: >= 1.03 (ref 1–1.03)
UROBILINOGEN, URINE: 0.2 EU/DL (ref 0–1)

## 2021-02-08 PROCEDURE — 1036F TOBACCO NON-USER: CPT | Performed by: FAMILY MEDICINE

## 2021-02-08 PROCEDURE — 99213 OFFICE O/P EST LOW 20 MIN: CPT | Performed by: FAMILY MEDICINE

## 2021-02-08 PROCEDURE — 3017F COLORECTAL CA SCREEN DOC REV: CPT | Performed by: FAMILY MEDICINE

## 2021-02-08 PROCEDURE — G8421 BMI NOT CALCULATED: HCPCS | Performed by: FAMILY MEDICINE

## 2021-02-08 PROCEDURE — G8484 FLU IMMUNIZE NO ADMIN: HCPCS | Performed by: FAMILY MEDICINE

## 2021-02-08 PROCEDURE — 74176 CT ABD & PELVIS W/O CONTRAST: CPT

## 2021-02-08 PROCEDURE — G8427 DOCREV CUR MEDS BY ELIG CLIN: HCPCS | Performed by: FAMILY MEDICINE

## 2021-02-08 PROCEDURE — 81002 URINALYSIS NONAUTO W/O SCOPE: CPT | Performed by: FAMILY MEDICINE

## 2021-02-08 RX ORDER — TAMSULOSIN HYDROCHLORIDE 0.4 MG/1
0.4 CAPSULE ORAL DAILY
Qty: 15 CAPSULE | Refills: 0 | Status: SHIPPED | OUTPATIENT
Start: 2021-02-08 | End: 2021-03-12

## 2021-02-08 RX ORDER — KETOROLAC TROMETHAMINE 10 MG/1
10 TABLET, FILM COATED ORAL
Qty: 20 TABLET | Refills: 0 | Status: ON HOLD
Start: 2021-02-08 | End: 2021-04-22 | Stop reason: HOSPADM

## 2021-02-08 ASSESSMENT — ENCOUNTER SYMPTOMS
NAUSEA: 1
ABDOMINAL PAIN: 0
RESPIRATORY NEGATIVE: 1
BACK PAIN: 1

## 2021-02-08 NOTE — PROGRESS NOTES
Authorization obtained per [facility] @ [id number] [phone]). Zelalem Duque #[id number], valid from 2/8/21 through 8/7/21. STAT CT abd/pelvis w/o scheduled at [facility] today @ 1400, arrival 1330. Patient notified and instructed.

## 2021-02-08 NOTE — TELEPHONE ENCOUNTER
----- Message from Fadumo Montilla MD sent at 2/8/2021  1:44 PM EST -----  Notify her that her CT shows a left distal ureteral stone with some hydroureteronephrosis. Stone measures 5mm, which is the cutoff for whether the stone should be able to pass on it's own or not. Would recommend flomax 0.4mg po qd #15/NR and toradol 10mg po q4-6 hours prn pain #20/NR. Push fluids and strain urine. Urology referral if not improved.  CG

## 2021-02-08 NOTE — TELEPHONE ENCOUNTER
Pt notified of the results and CG recommendations and she verbalized understanding. Rx's sent to [facility].

## 2021-02-08 NOTE — PROGRESS NOTES
2021    Idalmis Judd (:  1970) is a 44 y.o. female,Established patient, here for evaluation of the following chief complaint(s):  Flank Pain (C/O left flank pain that started this morning. )      ASSESSMENT/PLAN:    1. Acute left flank pain  -     POCT Urinalysis no Micro  -     CT ABDOMEN PELVIS WO CONTRAST Additional Contrast? None; Future  2. Hematuria, unspecified type  -     CT ABDOMEN PELVIS WO CONTRAST Additional Contrast? None; Future      She likely has a kidney stone. Will get CT abd/pelvis to confirm. She will proceed to ER if pain worsens. SUBJECTIVE/OBJECTIVE:    HPI  Patient comes in with acute onset of left flank pain that radiates to the left lower quadrant this morning. Has had some associated nausea and sweats. Pain was severe. Was en route to the ER when the pain subsided and she presented to the office. No gross hematuria. No dysuria, fever, chills. Review of Systems   Constitutional: Negative for fatigue and fever. HENT: Negative. Respiratory: Negative. Cardiovascular: Negative. Gastrointestinal: Positive for nausea. Negative for abdominal pain. Genitourinary: Positive for flank pain. Negative for dysuria and hematuria. Musculoskeletal: Positive for back pain. All other systems reviewed and are negative. Vitals:    21 1042   BP: 126/84   Pulse: 84   Resp: 16   Temp: 98 °F (36.7 °C)       Wt Readings from Last 3 Encounters:   18 145 lb (65.8 kg)   18 140 lb (63.5 kg)   10/31/17 145 lb 9.6 oz (66 kg)       BP Readings from Last 3 Encounters:   21 126/84   18 132/74   18 108/64       Physical Exam  Vitals signs reviewed. Constitutional:       General: She is not in acute distress. HENT:      Head: Normocephalic and atraumatic. Neck:      Musculoskeletal: Neck supple. Cardiovascular:      Rate and Rhythm: Normal rate and regular rhythm. Heart sounds: No murmur.    Pulmonary:      Effort: Pulmonary effort is normal.      Breath sounds: Normal breath sounds. No wheezing. Abdominal:      General: Abdomen is flat. Palpations: Abdomen is soft. Tenderness: There is no abdominal tenderness. There is no right CVA tenderness or left CVA tenderness. Musculoskeletal:      Right lower leg: No edema. Left lower leg: No edema. Lymphadenopathy:      Cervical: No cervical adenopathy. Neurological:      Mental Status: She is alert. Results for POC orders placed in visit on 02/08/21   POCT Urinalysis No Micro (Auto)   Result Value Ref Range    Glucose, Ur Negative NEGATIVE mg/dl    Bilirubin Urine Small (A)     Ketones, Urine 80 (A) NEGATIVE    Specific Gravity, Urine >= 1.030 1.002 - 1.030    Blood, UA POC Moderate (A) NEGATIVE    pH, Urine 5.50 5.0 - 9.0    Protein, Urine 30 (A) NEGATIVE mg/dl    Urobilinogen, Urine 0.20 0.0 - 1.0 eu/dl    Nitrite, Urine Negative NEGATIVE    Leukocyte Clumps, Urine Trace (A) NEGATIVE    Color, Urine Dark yellow (A) YELLOW-STRAW    Character, Urine Clear [email or web address]         An electronic signature was used to authenticate this note.         Electronically signed by Alexandrea Jensen MD on 2/8/2021 at 12:25 PM

## 2021-02-19 ENCOUNTER — TELEPHONE (OUTPATIENT)
Dept: FAMILY MEDICINE CLINIC | Age: 51
End: 2021-02-19

## 2021-02-19 DIAGNOSIS — N13.30 HYDROURETERONEPHROSIS: ICD-10-CM

## 2021-02-19 DIAGNOSIS — N20.0 KIDNEY STONE: Primary | ICD-10-CM

## 2021-03-12 ENCOUNTER — OFFICE VISIT (OUTPATIENT)
Dept: UROLOGY | Age: 51
End: 2021-03-12
Payer: COMMERCIAL

## 2021-03-12 VITALS — HEIGHT: 66 IN | WEIGHT: 147.6 LBS | TEMPERATURE: 97.4 F | BODY MASS INDEX: 23.72 KG/M2

## 2021-03-12 DIAGNOSIS — N20.1 URETERAL STONE: ICD-10-CM

## 2021-03-12 DIAGNOSIS — R10.9 FLANK PAIN: Primary | ICD-10-CM

## 2021-03-12 LAB
BILIRUBIN URINE: NEGATIVE
BLOOD URINE, POC: ABNORMAL
CHARACTER, URINE: CLEAR
COLOR, URINE: YELLOW
GLUCOSE URINE: NEGATIVE MG/DL
KETONES, URINE: NEGATIVE
LEUKOCYTE CLUMPS, URINE: NEGATIVE
NITRITE, URINE: NEGATIVE
PH, URINE: 6.5 (ref 5–9)
PROTEIN, URINE: NEGATIVE MG/DL
SPECIFIC GRAVITY, URINE: 1.02 (ref 1–1.03)
UROBILINOGEN, URINE: 0.2 EU/DL (ref 0–1)

## 2021-03-12 PROCEDURE — G8420 CALC BMI NORM PARAMETERS: HCPCS | Performed by: UROLOGY

## 2021-03-12 PROCEDURE — 3017F COLORECTAL CA SCREEN DOC REV: CPT | Performed by: UROLOGY

## 2021-03-12 PROCEDURE — 1036F TOBACCO NON-USER: CPT | Performed by: UROLOGY

## 2021-03-12 PROCEDURE — 99204 OFFICE O/P NEW MOD 45 MIN: CPT | Performed by: UROLOGY

## 2021-03-12 PROCEDURE — G8427 DOCREV CUR MEDS BY ELIG CLIN: HCPCS | Performed by: UROLOGY

## 2021-03-12 PROCEDURE — G8484 FLU IMMUNIZE NO ADMIN: HCPCS | Performed by: UROLOGY

## 2021-03-12 PROCEDURE — 81003 URINALYSIS AUTO W/O SCOPE: CPT | Performed by: UROLOGY

## 2021-03-12 RX ORDER — TAMSULOSIN HYDROCHLORIDE 0.4 MG/1
0.4 CAPSULE ORAL DAILY
Qty: 30 CAPSULE | Refills: 0 | Status: SHIPPED | OUTPATIENT
Start: 2021-03-12 | End: 2021-04-09

## 2021-03-12 NOTE — PROGRESS NOTES
Stanley Zuñiga MD  Urology Clinic office visit  NEW PATIENT    Patient:  Crys Rhodes  YOB: 1970  Date: 3/12/2021    HISTORY OF PRESENT ILLNESS:   The patient is a 44 y.o. female who presents today for evaluation of the following problems:      1. Flank pain    2. Ureteral stone         Overall the problem(s) : are worsening. Associated Symptoms: No dysuria, gross hematuria. Pain Severity:      Summary of old records: N/A  (Patient's old records, notes and chart reviewed and summarized above.)      Onset 2/8/2021  Severity is described as moderate. The course of symptoms over time is intermittent. Alleviating factors: none  Worsening factors: none  Lower urinary tract symptoms: urgency occasional  LEFT hydroureteronephrosis. 5 mm distal left ureteral stone.   Has not seen anything in toiled, has not been straining in last two weeks  Every once in a while has left twinge    Urinalysis today:  Results for POC orders placed in visit on 03/12/21   POCT Urinalysis No Micro (Auto)   Result Value Ref Range    Glucose, Ur Negative NEGATIVE mg/dl    Bilirubin Urine Negative     Ketones, Urine Negative NEGATIVE    Specific Gravity, Urine 1.025 1.002 - 1.030    Blood, UA POC Moderate (A) NEGATIVE    pH, Urine 6.50 5.0 - 9.0    Protein, Urine Negative NEGATIVE mg/dl    Urobilinogen, Urine 0.20 0.0 - 1.0 eu/dl    Nitrite, Urine Negative NEGATIVE    Leukocyte Clumps, Urine Negative NEGATIVE    Color, Urine Yellow YELLOW-STRAW    Character, Urine Clear CLR-SL.CLOUD       Last BUN and creatinine:  Lab Results   Component Value Date    BUN 14 09/28/2019     Lab Results   Component Value Date    CREATININE 0.63 09/28/2019       Imaging Reviewed during this Office Visit:   (results were independently reviewed by physician and radiology report verified)  I independently reviewed and verified the images and reports from:    Ct Abdomen Pelvis Wo Contrast Additional Contrast? None    Result Date: 2/8/2021  PROCEDURE: CT ABDOMEN PELVIS WO CONTRAST CLINICAL INFORMATION: Acute left flank pain, Hematuria, unspecified type . COMPARISON: None. TECHNIQUE: Axial 5 mm CT images were obtained through the abdomen and pelvis. No contrast was given. Coronal reconstructions were obtained. All CT scans at this facility use dose modulation, iterative reconstruction, and/or weight-based dosing when appropriate to reduce radiation dose to as low as reasonably achievable. FINDINGS: Limitations: Evaluation of the solid and hollow viscera is limited due to the lack of IV contrast. Lung bases are clear. Spleen pancreas gallbladder and adrenal glands are unremarkable. Hypoattenuating probable hepatic cysts largest adjacent to the falciform ligament 1.7 cm. Right kidney is unremarkable. The left hydroureteronephrosis. 5 mm distal left ureteral stone. Correlation with urinalysis is advised. No bladder wall thickening. Moderate scattered stool in the colon with diverticulosis. No evidence for diverticulitis. No acute osseous findings. Mild lumbar scoliosis. Bilateral L5-S1 pars defects. Small fat-containing umbilical hernia. LEFT hydroureteronephrosis. 5 mm distal left ureteral stone. Correlation with urinalysis is advised. **This report has been created using voice recognition software. It may contain minor errors which are inherent in voice recognition technology. ** Final report electronically signed by Dr. Marcos Galdamez on 2/8/2021 1:07 PM        PAST MEDICAL, FAMILY AND SOCIAL HISTORY:  Past Medical History:   Diagnosis Date    Hyperlipidemia      Past Surgical History:   Procedure Laterality Date    DILATION AND CURETTAGE OF UTERUS  1993    DILATION AND CURETTAGE OF UTERUS  10/31/2017    DILATION AND CURETTAGE OF UTERUS N/A 10/31/2017    DILATATION AND CURETTAGE HYSTEROSCOPY, AND ENDOMETRIAL ABLATION performed by Hay Montelongo MD at [facility]  10/31/2018    Dr Gustabo Ruano 10/31/2017    D/c amd emdometrial ablation    LAPAROSCOPY  1990    TONSILLECTOMY AND ADENOIDECTOMY  1973     Family History   Problem Relation Age of Onset    High Cholesterol Father     Cancer Father         prostate    Early Death Father     High Cholesterol Mother     Cancer Paternal Uncle         pancreatic    Early Death Paternal Uncle     Arthritis Maternal Grandmother     Diabetes Maternal Grandmother     Cancer Maternal Grandfather         ?colon    Cancer Paternal Grandfather     Early Death Paternal Grandfather     Cancer Paternal Uncle         prostate    Asthma Neg Hx     Birth Defects Neg Hx     Depression Neg Hx     Hearing Loss Neg Hx     Heart Disease Neg Hx     High Blood Pressure Neg Hx     Kidney Disease Neg Hx     Learning Disabilities Neg Hx     Mental Illness Neg Hx     Mental Retardation Neg Hx     Miscarriages / Stillbirths Neg Hx     Stroke Neg Hx     Substance Abuse Neg Hx     Vision Loss Neg Hx     Other Neg Hx      Outpatient Medications Marked as Taking for the 3/12/21 encounter (Office Visit) with Vicky Mckeon MD   Medication Sig Dispense Refill    tamsulosin (FLOMAX) 0.4 MG capsule Take 1 capsule by mouth daily 30 capsule 0    naproxen (NAPROSYN) 500 MG tablet Take 1 tablet by mouth 2 times daily (with meals) (Patient taking differently: Take 500 mg by mouth 2 times daily as needed for Pain ) 30 tablet 1    Cholecalciferol (VITAMIN D3) 2000 units CAPS Take 1 capsule by mouth daily      Turmeric 500 MG TABS Take 1 tablet by mouth 2 times daily      Multiple Vitamins-Minerals (THERAPEUTIC MULTIVITAMIN-MINERALS) tablet Take 1 tablet by mouth daily         Patient has no known allergies.   Social History     Tobacco Use   Smoking Status Never Smoker   Smokeless Tobacco Never Used       Social History     Substance and Sexual Activity   Alcohol Use Yes    Comment: social       REVIEW OF SYSTEMS:  Constitutional: negative  Eyes: negative  Respiratory: negative  Cardiovascular: negative  Gastrointestinal: negative  Genitourinary: negative except for what is in HPI  Musculoskeletal: negative  Skin: negative   Neurological: negative  Hematological/Lymphatic: negative  Psychological: negative    Physical Exam:    This a 44 y.o. female      Vitals:    03/12/21 0808   Temp: 97.4 °F (36.3 °C)     Constitutional: Patient in no acute distress. Neuro: Alert and oriented to person, place and time. Psych: mood and affect normal  HEENT negative  Lungs: Respiratory effort is normal  Cardiovascular: Normal peripheral pulses  Abdomen: Soft, non-tender, non-distended   Lymphatics: No palpable lymphadenopathy. Bladder non-tender and not distended. Assessment and Plan      1. Flank pain    2. Ureteral stone           Plan:       Patient with mild left flank pain    Stone management: Discussed all options of stone management- Medical expulsion therapy, surgery in form of ureteroscopy, ESWL. Discussed risks, benefits, alternatives of each. Discussed complication and expectations. Pt would like to try MET  Continue flomax and Norco  Increase fluids > 60oz daily. Strain all urine. Follow up 1 month with KUB and US. Discussed warning signs or fevers, chills, increased pain      Return in about 4 weeks (around 4/9/2021).          Prescriptions Ordered:  Orders Placed This Encounter   Medications    tamsulosin (FLOMAX) 0.4 MG capsule     Sig: Take 1 capsule by mouth daily     Dispense:  30 capsule     Refill:  0      Orders Placed:  Orders Placed This Encounter   Procedures    XR ABDOMEN (KUB) (SINGLE AP VIEW)     Standing Status:   Future     Standing Expiration Date:   3/12/2022    US RENAL COMPLETE     Standing Status:   Future     Standing Expiration Date:   3/13/2022    POCT Urinalysis No Micro (Auto)            MD Rober Barrios M.D [place], MD [facility]

## 2021-03-15 ENCOUNTER — TELEPHONE (OUTPATIENT)
Dept: UROLOGY | Age: 51
End: 2021-03-15

## 2021-03-15 NOTE — TELEPHONE ENCOUNTER
Patient scheduled for renal US  at [facility] on Tuesday 3/30/21 arrival time of 7:30 Am US at 8:00 AM.  Patient advised of instructions. Order mailed/given to patient.

## 2021-04-06 ENCOUNTER — HOSPITAL ENCOUNTER (OUTPATIENT)
Dept: GENERAL RADIOLOGY | Age: 51
Discharge: HOME | End: 2021-04-06
Payer: COMMERCIAL

## 2021-04-06 ENCOUNTER — HOSPITAL ENCOUNTER (OUTPATIENT)
Dept: ULTRASOUND IMAGING | Age: 51
Discharge: HOME | End: 2021-04-06
Payer: COMMERCIAL

## 2021-04-06 DIAGNOSIS — N20.1 URETERAL STONE: ICD-10-CM

## 2021-04-06 DIAGNOSIS — R10.9 FLANK PAIN: ICD-10-CM

## 2021-04-06 PROCEDURE — 74018 RADEX ABDOMEN 1 VIEW: CPT

## 2021-04-06 PROCEDURE — 76770 US EXAM ABDO BACK WALL COMP: CPT

## 2021-04-09 ENCOUNTER — OFFICE VISIT (OUTPATIENT)
Dept: UROLOGY | Age: 51
End: 2021-04-09
Payer: COMMERCIAL

## 2021-04-09 VITALS
DIASTOLIC BLOOD PRESSURE: 76 MMHG | BODY MASS INDEX: 23.95 KG/M2 | SYSTOLIC BLOOD PRESSURE: 108 MMHG | HEIGHT: 66 IN | WEIGHT: 149 LBS

## 2021-04-09 DIAGNOSIS — R10.9 FLANK PAIN: Primary | ICD-10-CM

## 2021-04-09 DIAGNOSIS — N20.1 URETERAL STONE: ICD-10-CM

## 2021-04-09 PROCEDURE — G8420 CALC BMI NORM PARAMETERS: HCPCS | Performed by: UROLOGY

## 2021-04-09 PROCEDURE — 99214 OFFICE O/P EST MOD 30 MIN: CPT | Performed by: UROLOGY

## 2021-04-09 PROCEDURE — 1036F TOBACCO NON-USER: CPT | Performed by: UROLOGY

## 2021-04-09 PROCEDURE — 3017F COLORECTAL CA SCREEN DOC REV: CPT | Performed by: UROLOGY

## 2021-04-09 PROCEDURE — G8427 DOCREV CUR MEDS BY ELIG CLIN: HCPCS | Performed by: UROLOGY

## 2021-04-09 RX ORDER — TAMSULOSIN HYDROCHLORIDE 0.4 MG/1
0.4 CAPSULE ORAL DAILY
Qty: 30 CAPSULE | Refills: 0 | Status: SHIPPED | OUTPATIENT
Start: 2021-04-09 | End: 2021-05-09

## 2021-04-09 NOTE — PROGRESS NOTES
Yamilet Gilman MD  Urology Clinic office visit    Patient:  Maribel Mckeon  YOB: 1970  Date: 4/9/2021    HISTORY OF PRESENT ILLNESS:   The patient is a 44 y.o. female who presents today for follow-up for the following problem(s):      1. Flank pain    2. Ureteral stone         Overall the problem(s) : show no change. Associated Symptoms: No dysuria, gross hematuria. Pain Severity:      Onset 2/8/2021  Severity is described as moderate. The course of symptoms over time is intermittent. Alleviating factors: none  Worsening factors: none  Lower urinary tract symptoms: urgency occasional  LEFT hydroureteronephrosis. 5 mm distal left ureteral stone. Has not seen anything in toiled, has not been straining in last two weeks  Every once in a while has left twinge      Imaging Reviewed during this Office Visit:   (results were independently reviewed by physician and radiology report verified)  I independently reviewed and verified the images and reports from:    Xr Abdomen (kub) (single Ap View)    Result Date: 4/6/2021  PROCEDURE: XR ABDOMEN (KUB) (SINGLE AP VIEW) CLINICAL INFORMATION: Ureteral stone, Flank pain COMPARISON: CT abdomen and pelvis, 2/8/2021 TECHNIQUE: A single supine image of the abdomen was obtained. FINDINGS: Intestinal gas pattern is normal. I see no free air. No mass lesion is seen. There is a small 5 mm calcification in the pelvis left side corresponds to the same calcification seen on prior CT scan and is apparently within the distal left ureter. .     5 mm calculus in the pelvis left side that represents the same calculus seen on prior CT scan 2/20/2021. This is presumably within the distal left ureter. Repeat CT scan abdomen and pelvis might be considered for further evaluation. **This report has been created using voice recognition software. It may contain minor errors which are inherent in voice recognition technology. ** Final report electronically signed by Dr. Rut Henderson Alhaji on 4/6/2021 9:11 AM    Us Renal Complete    Result Date: 4/6/2021  BILATERAL RENAL ULTRASOUND: CLINICAL INFORMATION: Flank pain, Ureteral stone COMPARISON: No comparison available. TECHNIQUE: Multiple sonographic images of both kidneys were obtained. Images of the urinary bladder were also obtained. FINDINGS: RIGHT KIDNEY - 10.1 x 5.8 x 5.4 cm Resistive Index - 0.59 Cortical Thickness - 1.0 cm LEFT KIDNEY - 10.8 x 5.4 x 5.8 cm Resistive Index - 0.64 Cortical Thickness - 1.3 cm URINARY BLADDER Pre-Void - 193 mL Post-Void - 18 mL  KIDNEYS:  Both kidneys are normal in size and contour. The resistive indices are normal.  MASS/CYST: No solid or cystic lesion of either kidney is seen. HYDRONEPHROSIS:  Findings suggestive of very mild hydronephrosis left side but no hydroureter. Findings suggestive of element of UPJ stenosis. CALCULI:  No calculi are seen. BLADDER:  The urinary bladder is unremarkable. .     Very mild hydronephrosis left side. No hydroureter. Findings suggestive of UPJ stenosis. **This report has been created using voice recognition software. It may contain minor errors which are inherent in voice recognition technology. ** Final report electronically signed by Dr. Mehdi Brooks on 4/6/2021 9:49 AM      Urinalysis today:  No results found for this visit on 04/09/21.     Last BUN and creatinine:  Lab Results   Component Value Date    BUN 14 09/28/2019     Lab Results   Component Value Date    CREATININE 0.63 09/28/2019       PAST MEDICAL, FAMILY AND SOCIAL HISTORY UPDATE:  Past Medical History:   Diagnosis Date    Hyperlipidemia      Past Surgical History:   Procedure Laterality Date    DILATION AND CURETTAGE OF UTERUS  1993    DILATION AND CURETTAGE OF UTERUS  10/31/2017    DILATION AND CURETTAGE OF UTERUS N/A 10/31/2017    DILATATION AND CURETTAGE HYSTEROSCOPY, AND ENDOMETRIAL ABLATION performed by Farideh Dumont MD at [address] [facility]  10/31/2018    Dr Arie Mohan negative  Hematological/Lymphatic: negative  Psychological: negative    Physical Exam:      Vitals:    04/09/21 0818   BP: 108/76     Patient is a 44 y.o. female in no acute distress and alert and oriented to person, place and time. NAD, Alert  Non labored respiration  Normal peripheral pulses  Soft, non tender  Skin-warm and dry  Psych- normal mood and affect    Assessment and Plan      1. Flank pain    2. Ureteral stone           Plan:       KUB and US reviewed and discussed options      Stone management: Discussed all options of stone management- Medical expulsion therapy, surgery in form of ureteroscopy, ESWL. Discussed risks, benefits, alternatives of each. Discussed complication and expectations. She is undecided and she will think about left URS/HLL or left ESWL (4/27/2021)           Prescriptions Ordered:  Orders Placed This Encounter   Medications    tamsulosin (FLOMAX) 0.4 MG capsule     Sig: Take 1 capsule by mouth daily     Dispense:  30 capsule     Refill:  0      Orders Placed:  No orders of the defined types were placed in this encounter.            MD Juan Ramon Gray M.D, MD  [facility]

## 2021-04-12 ENCOUNTER — TELEPHONE (OUTPATIENT)
Dept: UROLOGY | Age: 51
End: 2021-04-12

## 2021-04-12 DIAGNOSIS — Z01.818 PRE-OP TESTING: ICD-10-CM

## 2021-04-12 DIAGNOSIS — N20.1 URETERAL STONE: Primary | ICD-10-CM

## 2021-04-12 NOTE — TELEPHONE ENCOUNTER
Patient scheduled with Dr Remington Liao on 5/4/21. Surgery consent to be signed on arrival. Patient to do pre op fasting labs,urine culture and Covid 19 prior. Surgery instructions to be mailed to the patient. Tani Gonzalez with [facility] notified.  Confirmation # [id number]

## 2021-04-12 NOTE — TELEPHONE ENCOUNTER
SURGERY [address] [place], [place] [address]e Drive [facility], [address]      Phone *[phone] *[phone]   Surgical Scheduling Direct Line Phone *[phone] Fax *Dimple Mcdonnell 1970 female    [address] [place] [address]   Marital Status:          Home Phone: [phone]      Cell Phone:    Telephone Information:   Mobile [phone]          Surgeon: Dr. Darby Nicholas  Surgery Date: 5/4/21   Time: 9:30 am    Procedure: Left Extracorporeal Shockwave Lithotriopsy    Diagnosis: Kidney Lesle Nino     Important Medical History: In [facility]    Special Inst/Equip: Regular Room                 [facility] Confirmation # [id number]    CPT Codes:    30250  Latex Allergy:  No     Cardiac Device:  No     Anesthesia:  Anesthesiologist (General/Spinal)          Admission Type:  Same Day                             Admit Prior to Day of Surgery: No    Case Location:  Main OR           Preadmission Testing:Phone Call              PAT Date and Time:______________________________________________________    PAT Confirmation #: ______________________________________________________    Post Op Visit: ___________________________________________________________    Need Preop Cardiac Clearance: No    Does Patient have Cardiologist/physician?      None    Surgery Confirmation #: __________________________________________________    : ________________________   Date: __________________________     [employer] Name: [facility] [place]

## 2021-04-12 NOTE — TELEPHONE ENCOUNTER
DO NOT TAKE ASPIRIN, PLAVIX, FISH OIL, COUMADIN, IBUPROFEN, MOTRIN-LIKE DRUGS AND ANY MULTIVITAMINS OR OVER THE COUNTER SUPPLEMENTS 5 DAYS PRIOR TO SURGERY. Pinky Gonzalez 1970 Diagnosis: Kidney Stone    Surgical Physician: Dr. Joaquin Carpio have been scheduled for the procedure marked below:      Surgery: Left Extracorporeal Shock Wave Lithotripsy           Date: 5/4/21     Anesthesia: Anesthesiologist (General/Spinal)     Place of Service: [facility] Second Floor Same Day Surgery           Arrive to same day surgery by:  7:30 am  (Surgery time is subject to change)        INSTRUCTIONS AS MARKED BELOW:    1.  DO NOT eat or drink anything after midnight before surgery. 2.  We prefer you shower or bathe with an antibacterial soap (Dial) the morning of surgery. 3.  Please ensure to have a  with you to transport you home. 4.  Please bring a current medication list, photo ID and insurance card(s) with you  5. Okay to take Tylenol  6. If you take Glucophage, Metformin or Janumet, hold 48-hours prior to surgery  7. Take blood pressure or heart medication as directed, if taken in the morning take with a small sip of water  8. Please do the pre op fasting labs and urine culture on 4/23/21. Orders included. 9.  Please do the Covid 19 test on 4/27/21. This is date specific. Orders included. 10. The office will call you in 1-2 days after your procedure to schedule a follow up.       (Covid-19 screening is date sensitive and can only be done 5 to 7 days prior to your procedure)    Date: 4/12/2021

## 2021-04-13 ENCOUNTER — TELEPHONE (OUTPATIENT)
Dept: UROLOGY | Age: 51
End: 2021-04-13

## 2021-04-13 ENCOUNTER — PREP FOR PROCEDURE (OUTPATIENT)
Dept: UROLOGY | Age: 51
End: 2021-04-13

## 2021-04-13 RX ORDER — SODIUM CHLORIDE 9 MG/ML
INJECTION, SOLUTION INTRAVENOUS CONTINUOUS
Status: CANCELLED | OUTPATIENT
Start: 2021-05-04

## 2021-04-13 NOTE — TELEPHONE ENCOUNTER
No pre cert is needed per Kathy Ross at [facility] for CPT Code 80953.  Call ref # [id number]

## 2021-04-21 ENCOUNTER — APPOINTMENT (OUTPATIENT)
Dept: CT IMAGING | Age: 51
DRG: 661 | End: 2021-04-21
Payer: COMMERCIAL

## 2021-04-21 ENCOUNTER — APPOINTMENT (OUTPATIENT)
Dept: GENERAL RADIOLOGY | Age: 51
DRG: 661 | End: 2021-04-21
Payer: COMMERCIAL

## 2021-04-21 ENCOUNTER — HOSPITAL ENCOUNTER (INPATIENT)
Age: 51
LOS: 1 days | Discharge: HOME | DRG: 661 | End: 2021-04-22
Attending: FAMILY MEDICINE | Admitting: NURSE PRACTITIONER
Payer: COMMERCIAL

## 2021-04-21 DIAGNOSIS — N20.0 NEPHROLITHIASIS: Primary | ICD-10-CM

## 2021-04-21 PROBLEM — R10.9 LEFT FLANK PAIN: Status: ACTIVE | Noted: 2021-04-21

## 2021-04-21 LAB
ANION GAP SERPL CALCULATED.3IONS-SCNC: 14 MEQ/L (ref 8–16)
BACTERIA: ABNORMAL /HPF
BASOPHILS # BLD: 0.3 %
BASOPHILS ABSOLUTE: 0 THOU/MM3 (ref 0–0.1)
BILIRUBIN URINE: NEGATIVE
BLOOD, URINE: ABNORMAL
BUN BLDV-MCNC: 14 MG/DL (ref 7–22)
CALCIUM SERPL-MCNC: 9.5 MG/DL (ref 8.5–10.5)
CASTS 2: ABNORMAL /LPF
CASTS UA: ABNORMAL /LPF
CHARACTER, URINE: CLEAR
CHLORIDE BLD-SCNC: 101 MEQ/L (ref 98–111)
CO2: 21 MEQ/L (ref 23–33)
COLOR: YELLOW
CREAT SERPL-MCNC: 1.1 MG/DL (ref 0.4–1.2)
CRYSTALS, UA: ABNORMAL
EOSINOPHIL # BLD: 0.1 %
EOSINOPHILS ABSOLUTE: 0 THOU/MM3 (ref 0–0.4)
EPITHELIAL CELLS, UA: ABNORMAL /HPF
ERYTHROCYTE [DISTWIDTH] IN BLOOD BY AUTOMATED COUNT: 12.4 % (ref 11.5–14.5)
ERYTHROCYTE [DISTWIDTH] IN BLOOD BY AUTOMATED COUNT: 40.6 FL (ref 35–45)
GFR SERPL CREATININE-BSD FRML MDRD: 52 ML/MIN/1.73M2
GLUCOSE BLD-MCNC: 125 MG/DL (ref 70–108)
GLUCOSE URINE: NEGATIVE MG/DL
HCT VFR BLD CALC: 38.2 % (ref 37–47)
HEMOGLOBIN: 12.5 GM/DL (ref 12–16)
IMMATURE GRANS (ABS): 0.04 THOU/MM3 (ref 0–0.07)
IMMATURE GRANULOCYTES: 0.3 %
KETONES, URINE: 80
LEUKOCYTE ESTERASE, URINE: NEGATIVE
LYMPHOCYTES # BLD: 9 %
LYMPHOCYTES ABSOLUTE: 1.3 THOU/MM3 (ref 1–4.8)
MCH RBC QN AUTO: 29.3 PG (ref 26–33)
MCHC RBC AUTO-ENTMCNC: 32.7 GM/DL (ref 32.2–35.5)
MCV RBC AUTO: 89.7 FL (ref 81–99)
MISCELLANEOUS 2: ABNORMAL
MONOCYTES # BLD: 4.7 %
MONOCYTES ABSOLUTE: 0.7 THOU/MM3 (ref 0.4–1.3)
NITRITE, URINE: NEGATIVE
NUCLEATED RED BLOOD CELLS: 0 /100 WBC
OSMOLALITY CALCULATION: 273.9 MOSMOL/KG (ref 275–300)
PH UA: 5.5 (ref 5–9)
PLATELET # BLD: 327 THOU/MM3 (ref 130–400)
PMV BLD AUTO: 10.3 FL (ref 9.4–12.4)
POTASSIUM SERPL-SCNC: 3.9 MEQ/L (ref 3.5–5.2)
PROTEIN UA: 30
RBC # BLD: 4.26 MILL/MM3 (ref 4.2–5.4)
RBC URINE: ABNORMAL /HPF
RENAL EPITHELIAL, UA: ABNORMAL
SARS-COV-2, NAAT: NOT DETECTED
SEG NEUTROPHILS: 85.6 %
SEGMENTED NEUTROPHILS ABSOLUTE COUNT: 12.6 THOU/MM3 (ref 1.8–7.7)
SODIUM BLD-SCNC: 136 MEQ/L (ref 135–145)
SPECIFIC GRAVITY, URINE: > 1.03 (ref 1–1.03)
UROBILINOGEN, URINE: 1 EU/DL (ref 0–1)
WBC # BLD: 14.7 THOU/MM3 (ref 4.8–10.8)
WBC UA: ABNORMAL /HPF
YEAST: ABNORMAL

## 2021-04-21 PROCEDURE — 87635 SARS-COV-2 COVID-19 AMP PRB: CPT

## 2021-04-21 PROCEDURE — 96375 TX/PRO/DX INJ NEW DRUG ADDON: CPT

## 2021-04-21 PROCEDURE — 2580000003 HC RX 258: Performed by: NURSE PRACTITIONER

## 2021-04-21 PROCEDURE — 99284 EMERGENCY DEPT VISIT MOD MDM: CPT

## 2021-04-21 PROCEDURE — 81001 URINALYSIS AUTO W/SCOPE: CPT

## 2021-04-21 PROCEDURE — 2580000003 HC RX 258: Performed by: FAMILY MEDICINE

## 2021-04-21 PROCEDURE — 80048 BASIC METABOLIC PNL TOTAL CA: CPT

## 2021-04-21 PROCEDURE — 1200000000 HC SEMI PRIVATE

## 2021-04-21 PROCEDURE — 85025 COMPLETE CBC W/AUTO DIFF WBC: CPT

## 2021-04-21 PROCEDURE — 96376 TX/PRO/DX INJ SAME DRUG ADON: CPT

## 2021-04-21 PROCEDURE — 6360000002 HC RX W HCPCS: Performed by: FAMILY MEDICINE

## 2021-04-21 PROCEDURE — 74018 RADEX ABDOMEN 1 VIEW: CPT

## 2021-04-21 PROCEDURE — 6360000002 HC RX W HCPCS: Performed by: NURSE PRACTITIONER

## 2021-04-21 PROCEDURE — 36415 COLL VENOUS BLD VENIPUNCTURE: CPT

## 2021-04-21 PROCEDURE — 74176 CT ABD & PELVIS W/O CONTRAST: CPT

## 2021-04-21 PROCEDURE — 96374 THER/PROPH/DIAG INJ IV PUSH: CPT

## 2021-04-21 PROCEDURE — 99223 1ST HOSP IP/OBS HIGH 75: CPT | Performed by: FAMILY MEDICINE

## 2021-04-21 RX ORDER — KETOROLAC TROMETHAMINE 30 MG/ML
30 INJECTION, SOLUTION INTRAMUSCULAR; INTRAVENOUS EVERY 6 HOURS PRN
Status: DISCONTINUED | OUTPATIENT
Start: 2021-04-21 | End: 2021-04-22 | Stop reason: HOSPADM

## 2021-04-21 RX ORDER — SODIUM CHLORIDE 9 MG/ML
25 INJECTION, SOLUTION INTRAVENOUS PRN
Status: DISCONTINUED | OUTPATIENT
Start: 2021-04-21 | End: 2021-04-22 | Stop reason: HOSPADM

## 2021-04-21 RX ORDER — ONDANSETRON 2 MG/ML
4 INJECTION INTRAMUSCULAR; INTRAVENOUS EVERY 6 HOURS PRN
Status: DISCONTINUED | OUTPATIENT
Start: 2021-04-21 | End: 2021-04-22 | Stop reason: HOSPADM

## 2021-04-21 RX ORDER — POLYETHYLENE GLYCOL 3350 17 G/17G
17 POWDER, FOR SOLUTION ORAL DAILY PRN
Status: DISCONTINUED | OUTPATIENT
Start: 2021-04-21 | End: 2021-04-22 | Stop reason: HOSPADM

## 2021-04-21 RX ORDER — ACETAMINOPHEN 325 MG/1
650 TABLET ORAL EVERY 6 HOURS PRN
Status: DISCONTINUED | OUTPATIENT
Start: 2021-04-21 | End: 2021-04-22 | Stop reason: HOSPADM

## 2021-04-21 RX ORDER — MORPHINE SULFATE 2 MG/ML
2 INJECTION, SOLUTION INTRAMUSCULAR; INTRAVENOUS EVERY 4 HOURS PRN
Status: DISCONTINUED | OUTPATIENT
Start: 2021-04-21 | End: 2021-04-22 | Stop reason: HOSPADM

## 2021-04-21 RX ORDER — PROMETHAZINE HYDROCHLORIDE 25 MG/1
12.5 TABLET ORAL EVERY 6 HOURS PRN
Status: DISCONTINUED | OUTPATIENT
Start: 2021-04-21 | End: 2021-04-22 | Stop reason: HOSPADM

## 2021-04-21 RX ORDER — KETOROLAC TROMETHAMINE 30 MG/ML
30 INJECTION, SOLUTION INTRAMUSCULAR; INTRAVENOUS ONCE
Status: COMPLETED | OUTPATIENT
Start: 2021-04-21 | End: 2021-04-21

## 2021-04-21 RX ORDER — ONDANSETRON 2 MG/ML
4 INJECTION INTRAMUSCULAR; INTRAVENOUS ONCE
Status: COMPLETED | OUTPATIENT
Start: 2021-04-21 | End: 2021-04-21

## 2021-04-21 RX ORDER — SODIUM CHLORIDE 9 MG/ML
INJECTION, SOLUTION INTRAVENOUS CONTINUOUS
Status: DISCONTINUED | OUTPATIENT
Start: 2021-04-21 | End: 2021-04-22 | Stop reason: HOSPADM

## 2021-04-21 RX ORDER — SODIUM CHLORIDE 0.9 % (FLUSH) 0.9 %
10 SYRINGE (ML) INJECTION EVERY 12 HOURS SCHEDULED
Status: DISCONTINUED | OUTPATIENT
Start: 2021-04-21 | End: 2021-04-22 | Stop reason: HOSPADM

## 2021-04-21 RX ORDER — TAMSULOSIN HYDROCHLORIDE 0.4 MG/1
0.4 CAPSULE ORAL DAILY
Status: DISCONTINUED | OUTPATIENT
Start: 2021-04-21 | End: 2021-04-22 | Stop reason: HOSPADM

## 2021-04-21 RX ORDER — VITAMIN B COMPLEX
2000 TABLET ORAL DAILY
Status: DISCONTINUED | OUTPATIENT
Start: 2021-04-21 | End: 2021-04-22 | Stop reason: HOSPADM

## 2021-04-21 RX ORDER — FENTANYL CITRATE 50 UG/ML
50 INJECTION, SOLUTION INTRAMUSCULAR; INTRAVENOUS ONCE
Status: COMPLETED | OUTPATIENT
Start: 2021-04-21 | End: 2021-04-21

## 2021-04-21 RX ORDER — ACETAMINOPHEN 650 MG/1
650 SUPPOSITORY RECTAL EVERY 6 HOURS PRN
Status: DISCONTINUED | OUTPATIENT
Start: 2021-04-21 | End: 2021-04-22 | Stop reason: HOSPADM

## 2021-04-21 RX ORDER — SODIUM CHLORIDE 0.9 % (FLUSH) 0.9 %
10 SYRINGE (ML) INJECTION PRN
Status: DISCONTINUED | OUTPATIENT
Start: 2021-04-21 | End: 2021-04-22 | Stop reason: HOSPADM

## 2021-04-21 RX ADMIN — ONDANSETRON 4 MG: 2 INJECTION INTRAMUSCULAR; INTRAVENOUS at 14:37

## 2021-04-21 RX ADMIN — ONDANSETRON 4 MG: 2 INJECTION INTRAMUSCULAR; INTRAVENOUS at 16:59

## 2021-04-21 RX ADMIN — HYDROMORPHONE HYDROCHLORIDE 1 MG: 1 INJECTION, SOLUTION INTRAMUSCULAR; INTRAVENOUS; SUBCUTANEOUS at 15:22

## 2021-04-21 RX ADMIN — CEFTRIAXONE SODIUM 1000 MG: 1 INJECTION, POWDER, FOR SOLUTION INTRAMUSCULAR; INTRAVENOUS at 19:58

## 2021-04-21 RX ADMIN — ONDANSETRON 4 MG: 2 INJECTION INTRAMUSCULAR; INTRAVENOUS at 22:50

## 2021-04-21 RX ADMIN — KETOROLAC TROMETHAMINE 30 MG: 30 INJECTION, SOLUTION INTRAMUSCULAR; INTRAVENOUS at 14:37

## 2021-04-21 RX ADMIN — FENTANYL CITRATE 50 MCG: 50 INJECTION, SOLUTION INTRAMUSCULAR; INTRAVENOUS at 14:37

## 2021-04-21 RX ADMIN — HYDROMORPHONE HYDROCHLORIDE 1 MG: 1 INJECTION, SOLUTION INTRAMUSCULAR; INTRAVENOUS; SUBCUTANEOUS at 16:59

## 2021-04-21 RX ADMIN — SODIUM CHLORIDE: 9 INJECTION, SOLUTION INTRAVENOUS at 19:41

## 2021-04-21 ASSESSMENT — ENCOUNTER SYMPTOMS
NAUSEA: 1
ABDOMINAL PAIN: 1
VOMITING: 0
COLOR CHANGE: 0

## 2021-04-21 ASSESSMENT — PAIN DESCRIPTION - LOCATION
LOCATION: FLANK

## 2021-04-21 ASSESSMENT — PAIN DESCRIPTION - FREQUENCY
FREQUENCY: CONTINUOUS

## 2021-04-21 ASSESSMENT — PAIN DESCRIPTION - ORIENTATION
ORIENTATION: LEFT
ORIENTATION: LEFT

## 2021-04-21 ASSESSMENT — PAIN SCALES - GENERAL
PAINLEVEL_OUTOF10: 8
PAINLEVEL_OUTOF10: 2
PAINLEVEL_OUTOF10: 4
PAINLEVEL_OUTOF10: 10
PAINLEVEL_OUTOF10: 10
PAINLEVEL_OUTOF10: 7

## 2021-04-21 ASSESSMENT — PAIN DESCRIPTION - PAIN TYPE
TYPE: ACUTE PAIN

## 2021-04-21 ASSESSMENT — PAIN DESCRIPTION - DESCRIPTORS
DESCRIPTORS: SHARP
DESCRIPTORS: SHARP

## 2021-04-21 NOTE — ED TRIAGE NOTES
Pt presents to the ED via ED lobby with c/o left sided flank pain. Pt states she has a known 5mm kidney stone and is scheduled to have it removed. Pt states she was having intense pain last night but it subsided. Pain became intense again while pt was at work around 1300. Pt attempted to take toradol but vomited shortly after taking.

## 2021-04-21 NOTE — H&P
Hospitalist - History & Physical      Patient: Fco Caulk    Unit/Bed:21/021A  YOB: 1970  MRN: [id number]   Acct: [de-identified]   PCP: Tyrone Morris MD    Date of Service: Pt seen/examined on 04/21/21  and Admitted to Inpatient with expected LOS greater than two midnights due to medical therapy. Chief Complaint: Severe left flank pain    Assessment and Plan:-  1. Severe left flank pain related to 5 mm stone in the left ureterovesicular junction with moderate left hydroureteronephrosis: Patient received fentanyl, Dilaudid, Toradol, Zofran in the ER. Morphine 2 mg every 4 hours as needed for pain 7-10 on the scale, Toradol 30 mg every 6 hours as needed for pain 4-6. Continue Flomax 0.4 mg daily. Monitor urinary output. Consult urology for intervention if needed. 2. Nausea/vomiting related to current pain: Use Phenergan/Zofran as needed. 3. Slight leukocytosis: White blood cell count 14.7, likely reactional, urinalysis normal, will send urine culture, COVID-19 rapid in process. 4. 3.4 cm right ovarian cystic lesion: CT abdomen/pelvis recommended follow-up in 6 weeks with ultrasound. 5. Multiple hypodense lesions in the liver which are nonspecific: This is documented by CT abdomen/pelvis and recommended multiphase contrast enhanced CT or MRI of the liver for further evaluation. History Of Present Illness:    53-year-old female complaining from severe left flank pain started today. She rated her pain 10/10 on the scale when she came to the ER, she received Dilaudid and Toradol with fentanyl improved, although continue to be in pain, her problems been ongoing since February this year she is been scheduled for lithotripsy in May 4th. Because of worsening pain she came back to our ER for further evaluation and management. We will admit to control her severe left flank pain and consult urology.       Past Medical History:        Diagnosis Date    Hyperlipidemia        Past Surgical History:        Procedure Laterality Date    DILATION AND CURETTAGE OF UTERUS  1993    DILATION AND CURETTAGE OF UTERUS  10/31/2017    DILATION AND CURETTAGE OF UTERUS N/A 10/31/2017    DILATATION AND CURETTAGE HYSTEROSCOPY, AND ENDOMETRIAL ABLATION performed by Rupert Barnhart MD at [address] [facility]  10/31/2018    Dr Oswald Pan HYSTEROSCOPY  10/31/2017    D/c amd emdometrial ablation    LAPAROSCOPY  1990    TONSILLECTOMY AND ADENOIDECTOMY  1973       Home Medications:   No current facility-administered medications on file prior to encounter. Current Outpatient Medications on File Prior to Encounter   Medication Sig Dispense Refill    tamsulosin (FLOMAX) 0.4 MG capsule Take 1 capsule by mouth daily 30 capsule 0    ketorolac (TORADOL) 10 MG tablet Take 1 tablet by mouth every 4-6 hours as needed for Pain (Patient not taking: Reported on 3/12/2021) 20 tablet 0    naproxen (NAPROSYN) 500 MG tablet Take 1 tablet by mouth 2 times daily (with meals) (Patient not taking: Reported on 4/9/2021) 30 tablet 1    Cholecalciferol (VITAMIN D3) 2000 units CAPS Take 1 capsule by mouth daily      Turmeric 500 MG TABS Take 1 tablet by mouth 2 times daily      Multiple Vitamins-Minerals (THERAPEUTIC MULTIVITAMIN-MINERALS) tablet Take 1 tablet by mouth daily         Allergies:    Patient has no known allergies. Social History:    reports that she has never smoked. She has never used smokeless tobacco. She reports current alcohol use. She reports that she does not use drugs.     Family History:       Problem Relation Age of Onset    High Cholesterol Father     Cancer Father         prostate    Early Death Father     High Cholesterol Mother     Cancer Paternal Uncle         pancreatic    Early Death Paternal Uncle     Arthritis Maternal Grandmother     Diabetes Maternal Grandmother     Cancer Maternal Grandfather         ?colon    Cancer Paternal Grandfather     Early Death Paternal Grandfather     Cancer Paternal Uncle         prostate    Asthma Neg Hx     Birth Defects Neg Hx     Depression Neg Hx     Hearing Loss Neg Hx     Heart Disease Neg Hx     High Blood Pressure Neg Hx     Kidney Disease Neg Hx     Learning Disabilities Neg Hx     Mental Illness Neg Hx     Mental Retardation Neg Hx     Miscarriages / Stillbirths Neg Hx     Stroke Neg Hx     Substance Abuse Neg Hx     Vision Loss Neg Hx     Other Neg Hx        Diet:  Diet NPO Effective Now    Review of systems:   Pertinent positives as noted in the HPI. All other systems reviewed and negative. PHYSICAL EXAM:  /69   Pulse 69   Temp 97.6 °F (36.4 °C) (Oral)   Resp 16   Ht 5' 6\" (1.676 m)   Wt 143 lb (64.9 kg)   SpO2 98%   BMI 23.08 kg/m²   General appearance: No apparent distress, appears stated age and cooperative. HEENT: Normal cephalic, atraumatic without obvious deformity. Pupils equal, round, and reactive to light. Extra ocular muscles intact. Conjunctivae/corneas clear. Neck: Supple, with full range of motion. No jugular venous distention. Trachea midline. Respiratory:  Normal respiratory effort. Clear to auscultation, bilaterally without Rales/Wheezes/Rhonchi. Cardiovascular: Regular rate and rhythm with normal S1/S2 without murmurs, rubs or gallops. Abdomen: Soft, left flank nonspecific tenderness. Non-distended with normal bowel sounds. Musculoskeletal:  No clubbing, cyanosis or edema bilaterally. Skin: Skin color, texture, turgor normal.  No rashes or lesions. Neurologic:  Neurovascularly intact without any focal sensory/motor deficits.  Cranial nerves: II-XII intact, grossly non-focal.  Psychiatric: Alert and oriented, thought content appropriate, normal insight  Capillary Refill: Brisk,< 3 seconds   Peripheral Pulses: +2 palpable, equal bilaterally     Labs:   Recent Labs     04/21/21  1432   WBC 14.7*   HGB 12.5   HCT 38.2        Recent Labs     04/21/21  1432    K 3.9      CO2 21*   BUN 14   CREATININE 1.1   CALCIUM 9.5     No results for input(s): AST, ALT, BILIDIR, BILITOT, ALKPHOS in the last 72 hours. No results for input(s): INR in the last 72 hours. No results for input(s): Gordan Marlonmith in the last 72 hours. Urinalysis:    Lab Results   Component Value Date    NITRU NEGATIVE 04/21/2021    WBCUA 0-2 04/21/2021    BACTERIA NONE SEEN 04/21/2021    RBCUA 0-2 04/21/2021    BLOODU LARGE 04/21/2021    GLUCOSEU NEGATIVE 04/21/2021       Radiology:   CT ABDOMEN PELVIS WO CONTRAST Additional Contrast? None   Final Result       1. 5 mm stone in the left ureterovesicular junction with moderate left hydroureteronephrosis. 2. 3.4 cm right ovarian cystic lesion. Consider follow-up ultrasound in 6 weeks to ensure decrease in size. 3. Multiple hypodense lesions in the liver are nonspecific and not fully characterized. Consider multiphase contrast enhanced CT or MRI of the liver for further evaluation. **This report has been created using voice recognition software. It may contain minor errors which are inherent in voice recognition technology. **      Final report electronically signed by Dr. Barry Adame MD on 4/21/2021 5:18 PM      XR ABDOMEN (KUB) (SINGLE AP VIEW)   Final Result   No renal calculi identified. **This report has been created using voice recognition software. It may contain minor errors which are inherent in voice recognition technology. **      Final report electronically signed by Dr. Barry Adame MD on 4/21/2021 3:02 PM        Ct Abdomen Pelvis Wo Contrast Additional Contrast? None    Result Date: 4/21/2021  PROCEDURE: CT ABDOMEN PELVIS WO CONTRAST CLINICAL INFORMATION: left flank pain, history of nephrolith, not seen on kub . COMPARISON: CT abdomen pelvis dated 2/8/2021. TECHNIQUE: Axial 5 mm CT images were obtained through the abdomen and pelvis. No contrast was given.  Coronal and sagittal reconstructions were created. All CT scans at this facility use dose modulation, iterative reconstruction, and/or weight-based dosing when appropriate to reduce radiation dose to as low as reasonably achievable. FINDINGS:  There are mild posterior dependent changes. Visualized portions of the lungs are otherwise clear. The visualized portion of the heart is unremarkable. There are multiple hypodense lesions within the liver which are stable compared to prior exam and not fully characterized. The largest of these in the medial segment of the left lobe of liver measures 1.8 x 1.4 cm. The gallbladder is unremarkable. Adrenal glands are unremarkable. The spleen is unremarkable. The pancreas is unremarkable. No retroperitoneal or mesenteric lymphadenopathy is identified. There is a 5 mm calcified stone within the bladder at the left ureterovesicular junction. There is moderate hydroureteronephrosis on the left. No other nephroliths are identified. There is mild perinephric fat stranding on the left. The large bowel appears within normal limits. The appendix is normal in appearance. Small bowel also appears within normal limits. The bladder is nondistended. The uterus is unremarkable. There is a 3.4 x 3 cm hypodense cystic lesion in the right ovary. There is small free fluid in the pelvis. Visualized osseous structures appear intact without suspicious osseous lesion. 1. 5 mm stone in the left ureterovesicular junction with moderate left hydroureteronephrosis. 2. 3.4 cm right ovarian cystic lesion. Consider follow-up ultrasound in 6 weeks to ensure decrease in size. 3. Multiple hypodense lesions in the liver are nonspecific and not fully characterized. Consider multiphase contrast enhanced CT or MRI of the liver for further evaluation. **This report has been created using voice recognition software. It may contain minor errors which are inherent in voice recognition technology. ** Final report electronically signed by

## 2021-04-21 NOTE — ED NOTES
Pt states the pain increased after CT scan. Apollo Clemente NP notified. New orders placed.      Salvatore Carpio RN  04/21/21 9501

## 2021-04-21 NOTE — ED NOTES
ED to inpatient nurses report    No chief complaint on file. Present to ED from home  LOC: alert and orientated to name, place, date  Vital signs   Vitals:    04/21/21 1428 04/21/21 1525 04/21/21 1628 04/21/21 1720   BP: (!) 145/87 136/73 122/69 124/69   Pulse: 86 66 67 69   Resp: 22 16 15 16   Temp: 97.6 °F (36.4 °C)      TempSrc: Oral      SpO2: 100% 99% 99% 98%   Weight: 143 lb (64.9 kg)      Height: 5' 6\" (1.676 m)         Oxygen Baseline room iar    Current needs required none Bipap/Cpap No  LDAs:   Peripheral IV 04/21/21 Left Antecubital (Active)   Site Assessment Clean;Dry; Intact 04/21/21 1720   Line Status Normal saline locked 04/21/21 1720   Dressing Status Clean;Dry; Intact 04/21/21 1720     Mobility: Requires assistance * 1  Pending ED orders: none  Present condition: stable      Electronically signed by Griselda Baumgartner, RN on 4/21/2021 at 5:21 PM     Griselda Baumgartner, [facility]  04/21/21 1721

## 2021-04-21 NOTE — ED NOTES
Pt resting in bed with eyes closed, she states the pain is more tolerable at this time. Updated on plan of care. Will continue to monitor.      Gerson Zamarripa RN  04/21/21 6710

## 2021-04-21 NOTE — ED PROVIDER NOTES
[facility]    CHIEF COMPLAINT     No chief complaint on file. Nurses Notes reviewed and I agree except as noted in the HPI. HISTORY OF PRESENT ILLNESS    Philippe Schwab is a 44 y.o. female who presents to the ED for evaluation of left flank pain. Patient states has been ongoing problem since February, she notes that she is scheduled to have lithotripsy in May. She noted worsening of her pain since 11 AM today. She notes it is now intolerable. She notes coming in waves. She notes nausea with the pain. She denies any change in urine output or bowels. She denies fevers or chills. Denies any change in urination. Denies dysuria. HPI was provided by the patient. REVIEW OF SYSTEMS     Review of Systems   Constitutional: Negative for chills and fever. Gastrointestinal: Positive for abdominal pain and nausea. Negative for vomiting. Genitourinary: Positive for flank pain and pelvic pain. Negative for decreased urine volume, difficulty urinating and dysuria. Skin: Negative for color change. Allergic/Immunologic: Negative for immunocompromised state. Neurological: Negative for dizziness, weakness and headaches. Hematological: Does not bruise/bleed easily. Psychiatric/Behavioral: Negative for agitation, behavioral problems and confusion. PAST MEDICAL HISTORY     Past Medical History:   Diagnosis Date    Hyperlipidemia        SURGICALHISTORY      has a past surgical history that includes Tonsillectomy and adenoidectomy (1973); Dilation and curettage of uterus (1993); laparoscopy (0330); hysteroscopy (10/31/2017); Dilation and curettage of uterus (10/31/2017); Dilation and curettage of uterus (N/A, 10/31/2017); and Endometrial ablation (10/31/2018).     CURRENT MEDICATIONS       Previous Medications    CHOLECALCIFEROL (VITAMIN D3) 2000 UNITS CAPS    Take 1 capsule by mouth daily    KETOROLAC (TORADOL) 10 MG TABLET    Take 1 tablet by mouth every 4-6 hours as needed for Pain    MULTIPLE VITAMINS-MINERALS (THERAPEUTIC MULTIVITAMIN-MINERALS) TABLET    Take 1 tablet by mouth daily    NAPROXEN (NAPROSYN) 500 MG TABLET    Take 1 tablet by mouth 2 times daily (with meals)    TAMSULOSIN (FLOMAX) 0.4 MG CAPSULE    Take 1 capsule by mouth daily    TURMERIC 500 MG TABS    Take 1 tablet by mouth 2 times daily       ALLERGIES     has No Known Allergies. FAMILY HISTORY     She indicated that her mother is alive. She indicated that her father is . She indicated that the status of her maternal grandmother is unknown. She indicated that the status of her maternal grandfather is unknown. She indicated that the status of her paternal grandfather is unknown. She indicated that the status of her neg hx is unknown.   family history includes Arthritis in her maternal grandmother; Cancer in her father, maternal grandfather, paternal grandfather, paternal uncle, and paternal uncle; Diabetes in her maternal grandmother; Early Death in her father, paternal grandfather, and paternal uncle; High Cholesterol in her father and mother.     SOCIAL HISTORY       Social History     Socioeconomic History    Marital status:      Spouse name: Not on file    Number of children: Not on file    Years of education: Not on file    Highest education level: Not on file   Occupational History    Not on file   Social Needs    Financial resource strain: Not on file    Food insecurity     Worry: Not on file     Inability: Not on file    Transportation needs     Medical: Not on file     Non-medical: Not on file   Tobacco Use    Smoking status: Never Smoker    Smokeless tobacco: Never Used   Substance and Sexual Activity    Alcohol use: Yes     Comment: social    Drug use: No    Sexual activity: Yes     Partners: Male   Lifestyle    Physical activity     Days per week: Not on file     Minutes per session: Not on file    Stress: Not on file   Relationships    Social connections     Talks on phone: Not on file     Gets together: Not on file     Attends Congregational service: Not on file     Active member of club or organization: Not on file     Attends meetings of clubs or organizations: Not on file     Relationship status: Not on file    Intimate partner violence     Fear of current or ex partner: Not on file     Emotionally abused: Not on file     Physically abused: Not on file     Forced sexual activity: Not on file   Other Topics Concern    Not on file   Social History Narrative    Not on file       PHYSICAL EXAM     INITIAL VITALS:  height is 5' 6\" (1.676 m) and weight is 143 lb (64.9 kg). Her oral temperature is 97.6 °F (36.4 °C). Her blood pressure is 124/69 and her pulse is 69. Her respiration is 16 and oxygen saturation is 98%. Physical Exam  Vitals signs and nursing note reviewed. Constitutional:       Appearance: Normal appearance. She is well-developed. HENT:      Head: Normocephalic. Mouth/Throat:      Pharynx: Uvula midline. Eyes:      Conjunctiva/sclera: Conjunctivae normal.   Neck:      Musculoskeletal: Normal range of motion and neck supple. Cardiovascular:      Rate and Rhythm: Normal rate and regular rhythm. Heart sounds: Normal heart sounds, S1 normal and S2 normal.   Pulmonary:      Effort: Pulmonary effort is normal. No respiratory distress. Breath sounds: Normal breath sounds. Chest:      Chest wall: No tenderness. Abdominal:      General: Bowel sounds are normal. There is no distension. Palpations: Abdomen is soft. Tenderness: There is abdominal tenderness in the left lower quadrant. There is left CVA tenderness. Musculoskeletal: Normal range of motion. Lymphadenopathy:      Cervical: No cervical adenopathy. Skin:     General: Skin is warm and dry. Neurological:      Mental Status: She is alert and oriented to person, place, and time.    Psychiatric:         Speech: Speech normal.         Behavior: Behavior normal.         Thought Content: Thought content normal.         DIFFERENTIAL DIAGNOSIS:   Renal colic, UTI, pyelonephritis  DIAGNOSTIC RESULTS     RADIOLOGY: non-plainfilm images(s) such as CT, Ultrasound and MRI are read by the radiologist.  Plain radiographic images are visualized and preliminarily interpreted by the emergency physician unless otherwise stated below. CT ABDOMEN PELVIS WO CONTRAST Additional Contrast? None   Final Result       1. 5 mm stone in the left ureterovesicular junction with moderate left hydroureteronephrosis. 2. 3.4 cm right ovarian cystic lesion. Consider follow-up ultrasound in 6 weeks to ensure decrease in size. 3. Multiple hypodense lesions in the liver are nonspecific and not fully characterized. Consider multiphase contrast enhanced CT or MRI of the liver for further evaluation. **This report has been created using voice recognition software. It may contain minor errors which are inherent in voice recognition technology. **      Final report electronically signed by Dr. Desire Bee MD on 4/21/2021 5:18 PM      XR ABDOMEN (KUB) (SINGLE AP VIEW)   Final Result   No renal calculi identified. **This report has been created using voice recognition software. It may contain minor errors which are inherent in voice recognition technology. **      Final report electronically signed by Dr. Desire Bee MD on 4/21/2021 3:02 PM            LABS:   Labs Reviewed   CBC WITH AUTO DIFFERENTIAL - Abnormal; Notable for the following components:       Result Value    WBC 14.7 (*)     Segs Absolute 12.6 (*)     All other components within normal limits   BASIC METABOLIC PANEL - Abnormal; Notable for the following components:    CO2 21 (*)     Glucose 125 (*)     All other components within normal limits   URINE WITH REFLEXED MICRO - Abnormal; Notable for the following components:    Ketones, Urine 80 (*)     Specific Gravity, Urine > 1.030 (*)     Blood, Urine LARGE (*) 4/21/21 1522)   ondansetron (ZOFRAN) injection 4 mg (4 mg Intravenous Given 4/21/21 1659)   HYDROmorphone (DILAUDID) injection 1 mg (1 mg Intravenous Given 4/21/21 1659)       Patient was seenindependently by myself. The patient's final impression and disposition and plan was determined by myself. CRITICAL CARE:   None    CONSULTS:  Urology  Hospitalist    PROCEDURES:  None    FINAL IMPRESSION     1. Nephrolithiasis          DISPOSITION/PLAN   Patient admitted to the hospital service    PATIENT REFERREDTO:  No follow-up provider specified. DISCHARGE MEDICATIONS:  New Prescriptions    No medications on file       (Please note that portions of this note were completed with a voice recognition program.  Efforts were made to edit the dictations but occasionally words are mis-transcribed.)      Provider:  I personally performed the services described in the documentation,reviewed and edited the documentation which was dictated to the scribe in my presence, and it accurately records my words and actions.     Jamari Chi CNP 04/21/21 5:20 PM    Obey Chi APRN - CNP        [employer], KITTY - CNP  04/23/21 1012

## 2021-04-22 ENCOUNTER — TELEPHONE (OUTPATIENT)
Dept: UROLOGY | Age: 51
End: 2021-04-22

## 2021-04-22 ENCOUNTER — ANESTHESIA (OUTPATIENT)
Dept: OPERATING ROOM | Age: 51
DRG: 661 | End: 2021-04-22
Payer: COMMERCIAL

## 2021-04-22 ENCOUNTER — ANESTHESIA EVENT (OUTPATIENT)
Dept: OPERATING ROOM | Age: 51
DRG: 661 | End: 2021-04-22
Payer: COMMERCIAL

## 2021-04-22 VITALS
HEIGHT: 66 IN | TEMPERATURE: 97.7 F | BODY MASS INDEX: 22.98 KG/M2 | RESPIRATION RATE: 14 BRPM | WEIGHT: 143 LBS | OXYGEN SATURATION: 98 % | SYSTOLIC BLOOD PRESSURE: 119 MMHG | HEART RATE: 58 BPM | DIASTOLIC BLOOD PRESSURE: 67 MMHG

## 2021-04-22 VITALS
DIASTOLIC BLOOD PRESSURE: 60 MMHG | RESPIRATION RATE: 9 BRPM | SYSTOLIC BLOOD PRESSURE: 108 MMHG | OXYGEN SATURATION: 100 % | TEMPERATURE: 97.9 F

## 2021-04-22 LAB
ALBUMIN SERPL-MCNC: 3.7 G/DL (ref 3.5–5.1)
ALP BLD-CCNC: 68 U/L (ref 38–126)
ALT SERPL-CCNC: 7 U/L (ref 11–66)
ANION GAP SERPL CALCULATED.3IONS-SCNC: 13 MEQ/L (ref 8–16)
AST SERPL-CCNC: 15 U/L (ref 5–40)
BASOPHILS # BLD: 0.1 %
BASOPHILS ABSOLUTE: 0 THOU/MM3 (ref 0–0.1)
BILIRUB SERPL-MCNC: 0.4 MG/DL (ref 0.3–1.2)
BUN BLDV-MCNC: 11 MG/DL (ref 7–22)
CALCIUM SERPL-MCNC: 8.7 MG/DL (ref 8.5–10.5)
CHLORIDE BLD-SCNC: 106 MEQ/L (ref 98–111)
CO2: 20 MEQ/L (ref 23–33)
CREAT SERPL-MCNC: 0.6 MG/DL (ref 0.4–1.2)
EOSINOPHIL # BLD: 0 %
EOSINOPHILS ABSOLUTE: 0 THOU/MM3 (ref 0–0.4)
ERYTHROCYTE [DISTWIDTH] IN BLOOD BY AUTOMATED COUNT: 12.6 % (ref 11.5–14.5)
ERYTHROCYTE [DISTWIDTH] IN BLOOD BY AUTOMATED COUNT: 42.5 FL (ref 35–45)
GFR SERPL CREATININE-BSD FRML MDRD: > 90 ML/MIN/1.73M2
GLUCOSE BLD-MCNC: 92 MG/DL (ref 70–108)
HCT VFR BLD CALC: 35.5 % (ref 37–47)
HEMOGLOBIN: 11.4 GM/DL (ref 12–16)
IMMATURE GRANS (ABS): 0.02 THOU/MM3 (ref 0–0.07)
IMMATURE GRANULOCYTES: 0.2 %
LYMPHOCYTES # BLD: 13.2 %
LYMPHOCYTES ABSOLUTE: 1.2 THOU/MM3 (ref 1–4.8)
MCH RBC QN AUTO: 29.8 PG (ref 26–33)
MCHC RBC AUTO-ENTMCNC: 32.1 GM/DL (ref 32.2–35.5)
MCV RBC AUTO: 92.9 FL (ref 81–99)
MONOCYTES # BLD: 7.6 %
MONOCYTES ABSOLUTE: 0.7 THOU/MM3 (ref 0.4–1.3)
NUCLEATED RED BLOOD CELLS: 0 /100 WBC
PLATELET # BLD: 264 THOU/MM3 (ref 130–400)
PMV BLD AUTO: 10 FL (ref 9.4–12.4)
POTASSIUM REFLEX MAGNESIUM: 4.3 MEQ/L (ref 3.5–5.2)
RBC # BLD: 3.82 MILL/MM3 (ref 4.2–5.4)
SEG NEUTROPHILS: 78.9 %
SEGMENTED NEUTROPHILS ABSOLUTE COUNT: 7.3 THOU/MM3 (ref 1.8–7.7)
SODIUM BLD-SCNC: 139 MEQ/L (ref 135–145)
TOTAL PROTEIN: 6.2 G/DL (ref 6.1–8)
WBC # BLD: 9.2 THOU/MM3 (ref 4.8–10.8)

## 2021-04-22 PROCEDURE — APPNB30 APP NON BILLABLE TIME 0-30 MINS: Performed by: NURSE PRACTITIONER

## 2021-04-22 PROCEDURE — C1769 GUIDE WIRE: HCPCS | Performed by: UROLOGY

## 2021-04-22 PROCEDURE — 7100000000 HC PACU RECOVERY - FIRST 15 MIN: Performed by: UROLOGY

## 2021-04-22 PROCEDURE — 85025 COMPLETE CBC W/AUTO DIFF WBC: CPT

## 2021-04-22 PROCEDURE — 6360000002 HC RX W HCPCS: Performed by: NURSE ANESTHETIST, CERTIFIED REGISTERED

## 2021-04-22 PROCEDURE — 2709999900 HC NON-CHARGEABLE SUPPLY: Performed by: UROLOGY

## 2021-04-22 PROCEDURE — C1758 CATHETER, URETERAL: HCPCS | Performed by: UROLOGY

## 2021-04-22 PROCEDURE — 7100000001 HC PACU RECOVERY - ADDTL 15 MIN: Performed by: UROLOGY

## 2021-04-22 PROCEDURE — C1776 JOINT DEVICE (IMPLANTABLE): HCPCS | Performed by: UROLOGY

## 2021-04-22 PROCEDURE — 36415 COLL VENOUS BLD VENIPUNCTURE: CPT

## 2021-04-22 PROCEDURE — C2628 CATHETER, OCCLUSION: HCPCS | Performed by: UROLOGY

## 2021-04-22 PROCEDURE — 0TC78ZZ EXTIRPATION OF MATTER FROM LEFT URETER, VIA NATURAL OR ARTIFICIAL OPENING ENDOSCOPIC: ICD-10-PCS | Performed by: UROLOGY

## 2021-04-22 PROCEDURE — 3600000003 HC SURGERY LEVEL 3 BASE: Performed by: UROLOGY

## 2021-04-22 PROCEDURE — 3700000000 HC ANESTHESIA ATTENDED CARE: Performed by: UROLOGY

## 2021-04-22 PROCEDURE — 80053 COMPREHEN METABOLIC PANEL: CPT

## 2021-04-22 PROCEDURE — 2580000003 HC RX 258: Performed by: FAMILY MEDICINE

## 2021-04-22 PROCEDURE — 99253 IP/OBS CNSLTJ NEW/EST LOW 45: CPT | Performed by: NURSE PRACTITIONER

## 2021-04-22 PROCEDURE — 3700000001 HC ADD 15 MINUTES (ANESTHESIA): Performed by: UROLOGY

## 2021-04-22 PROCEDURE — 99239 HOSP IP/OBS DSCHRG MGMT >30: CPT | Performed by: NURSE PRACTITIONER

## 2021-04-22 PROCEDURE — 3600000013 HC SURGERY LEVEL 3 ADDTL 15MIN: Performed by: UROLOGY

## 2021-04-22 PROCEDURE — 2500000003 HC RX 250 WO HCPCS: Performed by: NURSE ANESTHETIST, CERTIFIED REGISTERED

## 2021-04-22 PROCEDURE — 2720000010 HC SURG SUPPLY STERILE: Performed by: UROLOGY

## 2021-04-22 PROCEDURE — 0T778DZ DILATION OF LEFT URETER WITH INTRALUMINAL DEVICE, VIA NATURAL OR ARTIFICIAL OPENING ENDOSCOPIC: ICD-10-PCS | Performed by: UROLOGY

## 2021-04-22 DEVICE — URETERAL STENT SET
Type: IMPLANTABLE DEVICE | Site: URETER | Status: FUNCTIONAL
Brand: PERCUFLEX™ PLUS

## 2021-04-22 RX ORDER — FENTANYL CITRATE 50 UG/ML
INJECTION, SOLUTION INTRAMUSCULAR; INTRAVENOUS PRN
Status: DISCONTINUED | OUTPATIENT
Start: 2021-04-22 | End: 2021-04-22 | Stop reason: SDUPTHER

## 2021-04-22 RX ORDER — MEPERIDINE HYDROCHLORIDE 25 MG/ML
12.5 INJECTION INTRAMUSCULAR; INTRAVENOUS; SUBCUTANEOUS EVERY 5 MIN PRN
Status: DISCONTINUED | OUTPATIENT
Start: 2021-04-22 | End: 2021-04-22 | Stop reason: HOSPADM

## 2021-04-22 RX ORDER — KETOROLAC TROMETHAMINE 10 MG/1
10 TABLET, FILM COATED ORAL EVERY 6 HOURS PRN
Qty: 20 TABLET | Refills: 0 | Status: SHIPPED | OUTPATIENT
Start: 2021-04-22 | End: 2022-04-22

## 2021-04-22 RX ORDER — MIDAZOLAM HYDROCHLORIDE 1 MG/ML
INJECTION INTRAMUSCULAR; INTRAVENOUS PRN
Status: DISCONTINUED | OUTPATIENT
Start: 2021-04-22 | End: 2021-04-22 | Stop reason: SDUPTHER

## 2021-04-22 RX ORDER — KETOROLAC TROMETHAMINE 30 MG/ML
INJECTION, SOLUTION INTRAMUSCULAR; INTRAVENOUS PRN
Status: DISCONTINUED | OUTPATIENT
Start: 2021-04-22 | End: 2021-04-22 | Stop reason: SDUPTHER

## 2021-04-22 RX ORDER — GLYCOPYRROLATE 1 MG/5 ML
SYRINGE (ML) INTRAVENOUS PRN
Status: DISCONTINUED | OUTPATIENT
Start: 2021-04-22 | End: 2021-04-22 | Stop reason: SDUPTHER

## 2021-04-22 RX ORDER — DEXAMETHASONE SODIUM PHOSPHATE 10 MG/ML
INJECTION, EMULSION INTRAMUSCULAR; INTRAVENOUS PRN
Status: DISCONTINUED | OUTPATIENT
Start: 2021-04-22 | End: 2021-04-22 | Stop reason: SDUPTHER

## 2021-04-22 RX ORDER — ONDANSETRON 2 MG/ML
4 INJECTION INTRAMUSCULAR; INTRAVENOUS
Status: DISCONTINUED | OUTPATIENT
Start: 2021-04-22 | End: 2021-04-22 | Stop reason: HOSPADM

## 2021-04-22 RX ORDER — LABETALOL 20 MG/4 ML (5 MG/ML) INTRAVENOUS SYRINGE
5 EVERY 10 MIN PRN
Status: DISCONTINUED | OUTPATIENT
Start: 2021-04-22 | End: 2021-04-22 | Stop reason: HOSPADM

## 2021-04-22 RX ORDER — LIDOCAINE HCL/PF 100 MG/5ML
SYRINGE (ML) INJECTION PRN
Status: DISCONTINUED | OUTPATIENT
Start: 2021-04-22 | End: 2021-04-22 | Stop reason: SDUPTHER

## 2021-04-22 RX ORDER — PROPOFOL 10 MG/ML
INJECTION, EMULSION INTRAVENOUS PRN
Status: DISCONTINUED | OUTPATIENT
Start: 2021-04-22 | End: 2021-04-22 | Stop reason: SDUPTHER

## 2021-04-22 RX ORDER — ONDANSETRON 2 MG/ML
INJECTION INTRAMUSCULAR; INTRAVENOUS PRN
Status: DISCONTINUED | OUTPATIENT
Start: 2021-04-22 | End: 2021-04-22 | Stop reason: SDUPTHER

## 2021-04-22 RX ORDER — FENTANYL CITRATE 50 UG/ML
50 INJECTION, SOLUTION INTRAMUSCULAR; INTRAVENOUS EVERY 5 MIN PRN
Status: DISCONTINUED | OUTPATIENT
Start: 2021-04-22 | End: 2021-04-22 | Stop reason: HOSPADM

## 2021-04-22 RX ADMIN — FENTANYL CITRATE 25 MCG: 50 INJECTION, SOLUTION INTRAMUSCULAR; INTRAVENOUS at 13:22

## 2021-04-22 RX ADMIN — FENTANYL CITRATE 12.5 MCG: 50 INJECTION, SOLUTION INTRAMUSCULAR; INTRAVENOUS at 13:32

## 2021-04-22 RX ADMIN — SODIUM CHLORIDE: 9 INJECTION, SOLUTION INTRAVENOUS at 10:09

## 2021-04-22 RX ADMIN — FENTANYL CITRATE 12.5 MCG: 50 INJECTION, SOLUTION INTRAMUSCULAR; INTRAVENOUS at 13:45

## 2021-04-22 RX ADMIN — DEXAMETHASONE SODIUM PHOSPHATE 10 MG: 10 INJECTION, EMULSION INTRAMUSCULAR; INTRAVENOUS at 13:22

## 2021-04-22 RX ADMIN — PROPOFOL 50 MG: 10 INJECTION, EMULSION INTRAVENOUS at 13:27

## 2021-04-22 RX ADMIN — Medication 60 MG: at 13:22

## 2021-04-22 RX ADMIN — Medication 0.2 MG: at 13:47

## 2021-04-22 RX ADMIN — MIDAZOLAM 2 MG: 1 INJECTION INTRAMUSCULAR; INTRAVENOUS at 13:20

## 2021-04-22 RX ADMIN — ONDANSETRON HYDROCHLORIDE 4 MG: 4 INJECTION, SOLUTION INTRAMUSCULAR; INTRAVENOUS at 13:22

## 2021-04-22 RX ADMIN — CEFTRIAXONE SODIUM 1000 MG: 1 INJECTION, POWDER, FOR SOLUTION INTRAMUSCULAR; INTRAVENOUS at 13:05

## 2021-04-22 RX ADMIN — KETOROLAC TROMETHAMINE 30 MG: 30 INJECTION, SOLUTION INTRAMUSCULAR at 13:54

## 2021-04-22 RX ADMIN — PROPOFOL 150 MG: 10 INJECTION, EMULSION INTRAVENOUS at 13:22

## 2021-04-22 ASSESSMENT — PULMONARY FUNCTION TESTS
PIF_VALUE: 19
PIF_VALUE: 10
PIF_VALUE: 2
PIF_VALUE: 7
PIF_VALUE: 19
PIF_VALUE: 3
PIF_VALUE: 16
PIF_VALUE: 9
PIF_VALUE: 17
PIF_VALUE: 10
PIF_VALUE: 1
PIF_VALUE: 0
PIF_VALUE: 16
PIF_VALUE: 10
PIF_VALUE: 7
PIF_VALUE: 10
PIF_VALUE: 21
PIF_VALUE: 17

## 2021-04-22 ASSESSMENT — PAIN DESCRIPTION - LOCATION: LOCATION: FLANK

## 2021-04-22 ASSESSMENT — PAIN SCALES - GENERAL
PAINLEVEL_OUTOF10: 0
PAINLEVEL_OUTOF10: 2

## 2021-04-22 ASSESSMENT — PAIN DESCRIPTION - DESCRIPTORS: DESCRIPTORS: SHARP

## 2021-04-22 ASSESSMENT — PAIN DESCRIPTION - ORIENTATION: ORIENTATION: LEFT

## 2021-04-22 NOTE — CARE COORDINATION
4/22/21, 8:43 AM EDT  DISCHARGE PLANNING EVALUATION:    Kassandra De Leon       Admitted: 4/21/2021/ [address] [facility] [address] day: 1   Location: -/05-A Reason for admit: Left flank pain [R10.9]   PMH:  has a past medical history of Hyperlipidemia. Procedure:   4/21 CT Abd/pelvis -   1. 5 mm stone in the left ureterovesicular junction with moderate left hydroureteronephrosis. 2. 3.4 cm right ovarian cystic lesion. Consider follow-up ultrasound in 6 weeks to ensure decrease in size. 3. Multiple hypodense lesions in the liver are nonspecific and not fully characterized. Consider multiphase contrast enhanced CT or MRI of the liver for further evaluation. Barriers to Discharge:  Admitted through ED with left flank pain. CT showed kidney stone. Consulted Urology. Planning cystoscopy. Pain control. IVF. Strain urine. PCP: Davy Wheat MD  Readmission Risk Score: 6%    Patient Goals/Plan/Treatment Preferences: Spoke with pt and . They live at home together. Pt is independent and works at [employer]. Denies any DME or HH needs. She is current with PCP, Dr. Marcos Beck. Anticipate discharge home tomorrow. Transportation/Food Security/Housekeeping Addressed:  No issues identified.

## 2021-04-22 NOTE — TELEPHONE ENCOUNTER
Patient had surgery on 4/22/21 with Dr Elaina Mcarthur. 5/4/21 surgery cancelled with Dr Racquel Hayes.  OR notified

## 2021-04-22 NOTE — ANESTHESIA POSTPROCEDURE EVALUATION
Department of Anesthesiology  Postprocedure Note    Patient: Herman Barrera  MRN: [id number]  YOB: 1970  Date of evaluation: 4/22/2021  Time:  3:55 PM     Procedure Summary     Date: 04/22/21 Room / Location: [facility]  / [facility] OR    Anesthesia Start: 9270 Anesthesia Stop: 6230    Procedure: CYSTOSCOPY, LEFT URETEROSCOPY LASER, BASKET RETRIEVAL OF STONES WITH LEFT URETERAL STENT PLACEMENT (Left Bladder) Diagnosis: (KIDNEY STONE)    Surgeons: Alisia Buchanan MD Responsible Provider: Sanaz To DO    Anesthesia Type: general ASA Status: 2          Anesthesia Type: general    Leonidas Phase I: Leonidas Score: 10    Leonidas Phase II:      Last vitals: Reviewed and per EMR flowsheets.        Anesthesia Post Evaluation    Patient location during evaluation: PACU  Patient participation: complete - patient participated  Level of consciousness: awake and alert  Pain score: 2  Airway patency: patent  Nausea & Vomiting: no nausea and no vomiting  Complications: no  Cardiovascular status: hemodynamically stable and blood pressure returned to baseline  Respiratory status: spontaneous ventilation, room air and acceptable  Hydration status: stable

## 2021-04-22 NOTE — CONSULTS
Urology Consult Note      Reason for Consult:  ureterovesical stone left side    Requesting Physician:  Dr. Pratt Files    History Obtained From:  patient, electronic medical record    Chief Complaint: severe left flank pain    HISTORY OF PRESENT ILLNESS:                The patient is a 44 y.o. female with significant past medical history of as listed below who presented to the ED with left flank pain, nausea and vomiting that started yesterday suddenly. She has a known left sided 5 mm UVJ stone since February. She is being seen by Dr. Jennifer Jolley and was scheduled for left ESWL on May 4th as she was not having any pain with the stone until yesterday. Repeat CT showed 5 mm stone in left UVJ with moderate left hydroureteronephrosis. UA negative. CRT 1.1 on admission, up from 0.6, and WBC was 14.7. She denies fever or chills or any urinary symptoms. Today she reports pain is improved. She had nausea through the night but is better now. Pain is more of a dull ache now. No prior hx of stones.     Past Medical History:        Diagnosis Date    Hyperlipidemia      Past Surgical History:        Procedure Laterality Date    BACK SURGERY      cervical neck surgery    DILATION AND CURETTAGE OF UTERUS  1993    DILATION AND CURETTAGE OF UTERUS  10/31/2017    DILATION AND CURETTAGE OF UTERUS N/A 10/31/2017    DILATATION AND CURETTAGE HYSTEROSCOPY, AND ENDOMETRIAL ABLATION performed by Kay Seaz MD at [address] [facility]  10/31/2018    Dr Vishnu Rodriguez HYSTEROSCOPY  10/31/2017    D/c amd emdometrial ablation    LAPAROSCOPY  1990    TONSILLECTOMY AND ADENOIDECTOMY  1973       Social History     Socioeconomic History    Marital status:      Spouse name: Jose Ramon Quispe Number of children: 3    Years of education: Not on file    Highest education level: Not on file   Occupational History    Occupation: nurse   Social Needs    Financial resource strain: Not on file   Casey-Adalid insecurity Worry: Not on file     Inability: Not on file    Transportation needs     Medical: Not on file     Non-medical: Not on file   Tobacco Use    Smoking status: Never Smoker    Smokeless tobacco: Never Used   Substance and Sexual Activity    Alcohol use: Yes     Comment: social    Drug use: No    Sexual activity: Yes     Partners: Male   Lifestyle    Physical activity     Days per week: Not on file     Minutes per session: Not on file    Stress: Not on file   Relationships    Social connections     Talks on phone: Not on file     Gets together: Not on file     Attends Confucianism service: Not on file     Active member of club or organization: Not on file     Attends meetings of clubs or organizations: Not on file     Relationship status: Not on file    Intimate partner violence     Fear of current or ex partner: Not on file     Emotionally abused: Not on file     Physically abused: Not on file     Forced sexual activity: Not on file   Other Topics Concern    Not on file   Social History Narrative    Not on file       AL  Family History   Problem Relation Age of Onset    High Cholesterol Father     Cancer Father         prostate    Early Death Father     Prostate Cancer Father     High Cholesterol Mother     Cancer Paternal Uncle         pancreatic    Early Death Paternal Uncle     Arthritis Maternal Grandmother     Diabetes Maternal Grandmother     Cancer Maternal Grandfather         ?colon    Cancer Paternal Grandfather     Early Death Paternal Grandfather     Cancer Paternal Uncle         prostate    Prostate Cancer Paternal Uncle     Asthma Neg Hx     Birth Defects Neg Hx     Depression Neg Hx     Hearing Loss Neg Hx     Heart Disease Neg Hx     High Blood Pressure Neg Hx     Kidney Disease Neg Hx     Learning Disabilities Neg Hx     Mental Illness Neg Hx     Mental Retardation Neg Hx     Miscarriages / Stillbirths Neg Hx     Stroke Neg Hx     Substance Abuse Neg Hx     Vision Loss Neg Hx     Other Neg Hx     Alcohol Abuse Neg Hx     Allergy (Severe) Neg Hx     Anemia Neg Hx     Arrhythmia Neg Hx     Atrial Fibrillation Neg Hx     Coronary Art Dis Neg Hx     Colon Cancer Neg Hx     Heart Attack Neg Hx     Obesity Neg Hx     Osteoporosis Neg Hx        Allergies:  No Known Allergies    ROS:  Constitutional: Negative for chills, fatigue, fever, or weight loss. Eyes: Denies reported visual changes. ENT: Denies headache, difficulty swallowing, nose bleeds, ringing in ears, or earaches. Cardiovascular: Negative for chest pain, palpitations, tachycardia or edema. Respiratory: Denies cough or SOB. GI:+ left flank pain, nausea and vomiting  : See HPI  Musculoskeletal: Patient denies low back pain or painful or reduced ROM of the back or extremities. Neurological: The patient denies any symptoms of neurological impairment or               TIA's; no history of stroke. Lymphatic: Denies swollen glands in neck, axillary or inguinal areas. Psychiatric: Denies anxiety or depression. Skin: Denies rash or lesions. The remainder of the complete ROS is negative    PHYSICAL EXAM:  VITALS:  /63   Pulse 65   Temp 98.2 °F (36.8 °C) (Oral)   Resp 18   Ht 5' 6\" (1.676 m)   Wt 143 lb (64.9 kg)   SpO2 98%   BMI 23.08 kg/m² . Nursing note and vitals reviewed. Constitutional:    Alert and oriented times 3, no acute distress and cooperative to examination with appropriate mood and affect. HEENT:   Head:         Normocephalic and atraumatic. Mouth/Throat:          Mucous membranes are normal.   Eyes:         EOM are normal. No scleral icterus. Nose:    The external appearance of the nose is normal  Ears: The ears appear normal to external inspection   Neck:         Supple, symmetrical, trachea midline, no adenopathy, thyroid symmetric, not enlarged and no tenderness. Cardiovascular:        Normal rate, regular rhythm, S1 S2 heart sounds.     Pulmonary/Chest:       Chest symmetric with normal A/P diameter, no wheezes, rales, or rhonchi noted. Normal respiratory rate and rhthym. No use of accessory muscles. Abdominal:          Soft. No tenderness. Active bowel sounds. Musculoskeletal:    Normal range of motion. She exhibits no edema or tenderness of lower extremities. Extremities:    No cyanosis, clubbing, or edema present. Neurological:    Alert and oriented. DATA:  CBC:   Lab Results   Component Value Date    WBC 9.2 04/22/2021    RBC 3.82 04/22/2021    HGB 11.4 04/22/2021    HCT 35.5 04/22/2021    MCV 92.9 04/22/2021    MCH 29.8 04/22/2021    MCHC 32.1 04/22/2021    RDW 13.1 09/28/2019     04/22/2021    MPV 10.0 04/22/2021     BMP:    Lab Results   Component Value Date     04/22/2021    K 4.3 04/22/2021     04/22/2021    CO2 20 04/22/2021    BUN 11 04/22/2021    CREATININE 0.6 04/22/2021    CALCIUM 8.7 04/22/2021    LABGLOM >90 04/22/2021    GLUCOSE 92 04/22/2021    GLUCOSE 88 09/28/2019     BUN/Creatinine:    Lab Results   Component Value Date    BUN 11 04/22/2021    CREATININE 0.6 04/22/2021     Magnesium:  No results found for: MG  Phosphorus:    Lab Results   Component Value Date    PHOS 2.8 09/28/2019     PT/INR:  No results found for: PROTIME, INR  U/A:    Lab Results   Component Value Date    COLORU YELLOW 04/21/2021    PHUR 5.5 04/21/2021    WBCUA 0-2 04/21/2021    RBCUA 0-2 04/21/2021    YEAST NONE SEEN 04/21/2021    BACTERIA NONE SEEN 04/21/2021    LEUKOCYTESUR NEGATIVE 04/21/2021    UROBILINOGEN 1.0 04/21/2021    BILIRUBINUR NEGATIVE 04/21/2021    BLOODU LARGE 04/21/2021    GLUCOSEU NEGATIVE 04/21/2021       Imaging: The patient has had a CT Stone Protocol which I have reviewed along with its accompanying report. The study demonstrates :       Impression       1. 5 mm stone in the left ureterovesicular junction with moderate left hydroureteronephrosis. 2. 3.4 cm right ovarian cystic lesion.  Consider follow-up ultrasound in 6 weeks to ensure decrease in size. 3. Multiple hypodense lesions in the liver are nonspecific and not fully characterized. Consider multiphase contrast enhanced CT or MRI of the liver for further evaluation.                     IMPRESSION:     Left flank pain   5 mm Left UVJ stone with hydronephrosis  Right ovarian cyst  Liver lesions    Plan:      NPO  COVID negative  Hasnt seen or felt stone pass. Pt with 5 mm left sided ureteral stone since February, scheduled for Left ESWL in May, came to ER yesterday due to worsening left flank pain. Recommend surgical intervention today with Dr. Madeline Gallardo. Recommend Cystoscopy, left ureteroscopy, laser lithotripsy, basket retrieval of stone fragments, and left ureteral stent placement per Dr. Madeline Gallardo. I described the procedure in detail and also described the associated risks and benefits at length. We discussed possible alternative therapies. We discussed the risks and benefits of not undergoing therapy. Patient understands these risks and benefits and desires to proceed. Post-op expectations were discussed; stent pain, urinary frequency and urgency secondary to the stent, dysuria which should improve 1-2 days after procedure, and intermittent hematuria can be expected as long as stent is in place.         Thank you for including us in the care of [address] [place] [address], APRN  04/22/21 8:04 AM  Urology

## 2021-04-22 NOTE — ANESTHESIA PRE PROCEDURE
Department of Anesthesiology  Preprocedure Note       Name:  Alexandrea Dumont   Age:  44 y.o.  :  1970                                          MRN:  [id number]         Date:  2021      Surgeon: Henrik Tineo):  Leonor Marie MD    Procedure: Procedure(s):  CYSTOSCOPY, LEFT URETEROSCOPY LASER, BASKET RETRIEVAL OF STONES WITH LEFT URETERAL STENT PLACEMENT    Medications prior to admission:   Prior to Admission medications    Medication Sig Start Date End Date Taking?  Authorizing Provider   tamsulosin (FLOMAX) 0.4 MG capsule Take 1 capsule by mouth daily 21 Yes Leonor Marie MD   ketorolac (TORADOL) 10 MG tablet Take 1 tablet by mouth every 4-6 hours as needed for Pain 21 Yes Jasmyn Taylor MD   Cholecalciferol (VITAMIN D3) 2000 units CAPS Take 1 capsule by mouth daily   Yes Historical Provider, MD   Turmeric 500 MG TABS Take 1 tablet by mouth 2 times daily   Yes Historical Provider, MD   Multiple Vitamins-Minerals (THERAPEUTIC MULTIVITAMIN-MINERALS) tablet Take 1 tablet by mouth daily   Yes Historical Provider, MD   naproxen (NAPROSYN) 500 MG tablet Take 1 tablet by mouth 2 times daily (with meals)  Patient not taking: Reported on 2021   Jasmyn Taylor MD       Current medications:    Current Facility-Administered Medications   Medication Dose Route Frequency Provider Last Rate Last Admin    sodium chloride flush 0.9 % injection 10 mL  10 mL Intravenous 2 times per day Tanesha Bahena MD        sodium chloride flush 0.9 % injection 10 mL  10 mL Intravenous PRN Tanesha Bahena MD        0.9 % sodium chloride infusion  25 mL Intravenous PRN Tanesha Bahena MD        enoxaparin (LOVENOX) injection 40 mg  40 mg Subcutaneous Q24H Tanesha Bahena MD        promethazine (PHENERGAN) tablet 12.5 mg  12.5 mg Oral Q6H PRN Tanesha Bahena MD        Or    ondansetron (ZOFRAN) injection 4 mg  4 mg Intravenous Q6H PRN Tanesha Bahena MD   4 mg at 21 [id number]    polyethylene glycol (GLYCOLAX) packet 17 g  17 g Oral Daily PRN Mark Jurado MD        acetaminophen (TYLENOL) tablet 650 mg  650 mg Oral Q6H PRN Mark Jurado MD        Or    acetaminophen (TYLENOL) suppository 650 mg  650 mg Rectal Q6H PRN Mark Jurado MD        0.9 % sodium chloride infusion   Intravenous Continuous Mark Jurado MD 75 mL/hr at 04/22/21 1009 New Bag at 04/22/21 1009    Vitamin D (CHOLECALCIFEROL) tablet 2,000 Units  2,000 Units Oral Daily Mark Jurado MD        tamsulosin (FLOMAX) capsule 0.4 mg  0.4 mg Oral Daily Mark Jurado MD        morphine (PF) injection 2 mg  2 mg Intravenous Q4H PRN Mark Jurado MD        ketorolac (TORADOL) injection 30 mg  30 mg Intravenous Q6H PRN Mark Jurado MD        cefTRIAXone (ROCEPHIN) 1000 mg IVPB in 50 mL D5W minibag  1,000 mg Intravenous Q24H [facility] 0 mL/hr at 04/21/21 2108 1,000 mg at 04/22/21 1305       Allergies:  No Known Allergies    Problem List:    Patient Active Problem List   Diagnosis Code    Left flank pain R10.9       Past Medical History:        Diagnosis Date    Hyperlipidemia        Past Surgical History:        Procedure Laterality Date    BACK SURGERY      cervical neck surgery    DILATION AND CURETTAGE OF UTERUS  1993    DILATION AND CURETTAGE OF UTERUS  10/31/2017    DILATION AND CURETTAGE OF UTERUS N/A 10/31/2017    DILATATION AND CURETTAGE HYSTEROSCOPY, AND ENDOMETRIAL ABLATION performed by Farideh Dumont MD at [address] [facility]  10/31/2018    Dr Uche Beltran HYSTEROSCOPY  10/31/2017    D/c amd emdometrial ablation    LAPAROSCOPY  1990    TONSILLECTOMY AND ADENOIDECTOMY  1973       Social History:    Social History     Tobacco Use    Smoking status: Never Smoker    Smokeless tobacco: Never Used   Substance Use Topics    Alcohol use: Yes     Comment: social                                Counseling given: Not Answered      Vital Signs (Current):   Vitals: 04/22/21 0030 04/22/21 0315 04/22/21 0830 04/22/21 1226   BP: (!) 103/58 102/63 (!) 110/55 (!) 105/53   Pulse: 74 65 65 66   Resp: 18 18 16 16   Temp: 98.2 °F (36.8 °C) 98.2 °F (36.8 °C) 98.1 °F (36.7 °C) 98.1 °F (36.7 °C)   TempSrc: Oral Oral Oral Oral   SpO2: 98% 98% 98% 99%   Weight:       Height:                                                  BP Readings from Last 3 Encounters:   04/22/21 (!) 105/53   04/22/21 139/67   04/09/21 108/76       NPO Status:                                                                                 BMI:   Wt Readings from Last 3 Encounters:   04/21/21 143 lb (64.9 kg)   04/09/21 149 lb (67.6 kg)   03/12/21 147 lb 9.6 oz (67 kg)     Body mass index is 23.08 kg/m². CBC:   Lab Results   Component Value Date    WBC 9.2 04/22/2021    RBC 3.82 04/22/2021    HGB 11.4 04/22/2021    HCT 35.5 04/22/2021    MCV 92.9 04/22/2021    RDW 13.1 09/28/2019     04/22/2021       CMP:   Lab Results   Component Value Date     04/22/2021    K 4.3 04/22/2021     04/22/2021    CO2 20 04/22/2021    BUN 11 04/22/2021    CREATININE 0.6 04/22/2021    AGRATIO 1.4 09/28/2019    LABGLOM >90 04/22/2021    GLUCOSE 92 04/22/2021    GLUCOSE 88 09/28/2019    PROT 6.2 04/22/2021    CALCIUM 8.7 04/22/2021    BILITOT 0.4 04/22/2021    ALKPHOS 68 04/22/2021    AST 15 04/22/2021    ALT 7 04/22/2021       POC Tests: No results for input(s): POCGLU, POCNA, POCK, POCCL, POCBUN, POCHEMO, POCHCT in the last 72 hours.     Coags: No results found for: PROTIME, INR, APTT    HCG (If Applicable):   Lab Results   Component Value Date    PREGTESTUR negative 10/31/2017        ABGs: No results found for: PHART, PO2ART, AKI3POA, BTO1GME, BEART, B6GUBNWC     Type & Screen (If Applicable):  No results found for: LABABO, LABRH    Drug/Infectious Status (If Applicable):  No results found for: HIV, HEPCAB    COVID-19 Screening (If Applicable):   Lab Results   Component Value Date    COVID19 NOT DETECTED 04/21/2021

## 2021-04-22 NOTE — PLAN OF CARE
Problem: Pain:  Goal: Pain level will decrease  Description: Pain level will decrease  Outcome: Ongoing  Note: Pain medications administered as needed as well as medication for nausea and vomiting. Patient states pain is tolerable at a 2-3 on a 1-10 scale. Care plan reviewed with patient. Patient verbalize understanding of the plan of care and contribute to goal setting.

## 2021-04-22 NOTE — PROGRESS NOTES
Hospitalist Progress Note    Patient:  Randall Mohan      Unit/Bed:6E-56/056-A    YOB: 1970    MRN: [id number]       Acct: [de-identified]     PCP: Sidney Medina MD    Date of Admission: 4/21/2021    Assessment/Plan:    1. 5 mm stone in the left ureterovesicular junction with moderate left hydroureteronephrosis--appreciate urology input, planning cystoscopy, left ureteroscopy, laser lithotripsy, basket retrieval of stone fragments and left ureteral stent placement today  2. 3.4 cm right ovarian cystic lesion--follow-up ultrasound in 6 weeks to ensure decrease in size  3. Multiple hypodense lesions in the liver--per CT abdomen and pelvis report consider multiphase contrast-enhanced CT or MRI of the liver for further evaluation; LFTs are normal  4. Leukocytosis--resolved  5. Hyperlipidemia--no home meds listed        Expected discharge date: Today or tomorrow    Disposition:    [x] Home       [] TCU       [] Rehab       [] Psych       [] SNF       [] [facility]       [] Other-    Chief Complaint: Left flank pain    Hospital Course: Per H&P dated 321: \"44year-old female complaining from severe left flank pain started today. She rated her pain 10/10 on the scale when she came to the ER, she received Dilaudid and Toradol with fentanyl improved, although continue to be in pain, her problems been ongoing since February this year she is been scheduled for lithotripsy in May 4th. Because of worsening pain she came back to our ER for further evaluation and management. We will admit to control her severe left flank pain and consult urology. \"    4/22--> hemodynamically stable, planning surgery for the kidney stone today       Subjective (past 24 hours): Relates to much improved pain and nausea today, states it is 4 out of 10, n.p.o. for surgery and spouse at bedside      Medications:  Reviewed    Infusion Medications    sodium chloride      sodium chloride 75 mL/hr at 04/21/21 1941 Scheduled Medications    sodium chloride flush  10 mL Intravenous 2 times per day    enoxaparin  40 mg Subcutaneous Q24H    Vitamin D  2,000 Units Oral Daily    tamsulosin  0.4 mg Oral Daily    cefTRIAXone (ROCEPHIN) IV  1,000 mg Intravenous Q24H     PRN Meds: sodium chloride flush, sodium chloride, promethazine **OR** ondansetron, polyethylene glycol, acetaminophen **OR** acetaminophen, morphine, ketorolac      Intake/Output Summary (Last 24 hours) at 4/22/2021 0810  Last data filed at 4/22/2021 0315  Gross per 24 hour   Intake 597 ml   Output 650 ml   Net -53 ml       Diet:  Diet NPO, After Midnight    Exam:  /63   Pulse 65   Temp 98.2 °F (36.8 °C) (Oral)   Resp 18   Ht 5' 6\" (1.676 m)   Wt 143 lb (64.9 kg)   SpO2 98%   BMI 23.08 kg/m²     General appearance: No apparent distress, appears stated age and cooperative. HEENT: Pupils equal, round, and reactive to light. Conjunctivae/corneas clear. Neck: Supple, with full range of motion. No jugular venous distention. Trachea midline. Respiratory:  Normal respiratory effort. Clear to auscultation, bilaterally without Rales/Wheezes/Rhonchi. Cardiovascular: Regular rate and rhythm with normal S1/S2 without murmurs, rubs or gallops. Abdomen: Soft, non-tender, non-distended with normal bowel sounds. Musculoskeletal: passive and active ROM x 4 extremities. Skin: Skin color, texture, turgor normal.    Neurologic:  Neurovascularly intact without any focal sensory/motor deficits.  Cranial nerves: II-XII intact, grossly non-focal.  Psychiatric: Alert and oriented, thought content appropriate  Capillary Refill: Brisk,< 3 seconds   Peripheral Pulses: +2 palpable, equal bilaterally       Labs:   Recent Labs     04/21/21  1432 04/22/21  0515   WBC 14.7* 9.2   HGB 12.5 11.4*   HCT 38.2 35.5*    264     Recent Labs     04/21/21  1432 04/22/21  0515    139   K 3.9 4.3    106   CO2 21* 20*   BUN 14 11   CREATININE 1.1 0.6   CALCIUM 9.5 8.7 [x] no    Active Hospital Problems    Diagnosis Date Noted    Left flank pain [R10.9] 04/21/2021       Electronically signed by Audrey Schaumann, APRN - CNP on 4/22/2021 at 8:10 AM

## 2021-04-23 ENCOUNTER — CARE COORDINATION (OUTPATIENT)
Dept: CASE MANAGEMENT | Age: 51
End: 2021-04-23

## 2021-04-23 ENCOUNTER — TELEPHONE (OUTPATIENT)
Dept: FAMILY MEDICINE CLINIC | Age: 51
End: 2021-04-23

## 2021-04-23 DIAGNOSIS — N20.1 URETERAL STONE: Primary | ICD-10-CM

## 2021-04-23 NOTE — DISCHARGE SUMMARY
Hospital Medicine Discharge Summary      Patient Identification:   July Lee   : 1970  MRN: [id number]   Account: [de-identified]      Patient's PCP: Zayra Read MD    Admit Date: 2021     Discharge Date: 2021      Admitting Physician: KITTY Stinson CNP     Discharging Nurse Practitioner: KITTY Stinson CNP     Discharge Diagnoses with Assessment/Plan:  1. 5 mm stone in the left ureterovesicular junction with moderate left hydroureteronephrosis S/P cystoscopy, left-sided ureteroscopy with holmium laser lithotripsy, left-sided stent placement on 2021--per urology note~okay for discharge home, she needs to follow-up with urology and the patient can pull the stent in 7 days; Flomax and Toradol  2. 3.4 cm right ovarian cystic lesion--follow-up ultrasound in 6 weeks to ensure decrease in size; she needs to follow-up with her PCP  3. Multiple hypodense lesions in the liver--per CT abdomen and pelvis report consider multiphase contrast-enhanced CT or MRI of the liver for further evaluation; LFTs are normal  4. Leukocytosis--resolved  5. Hyperlipidemia--no home meds listed     The patient was seen and examined on day of discharge and this discharge summary is in conjunction with any daily progress note from day of discharge. Hospital Course:   July Lee is a 44 y.o. female admitted to [facility] on 2021 for left flank pain; Per H&P dated 321: \"44year-old female complaining from severe left flank pain started today.  She rated her pain 10/10 on the scale when she came to the ER, she received Dilaudid and Toradol with fentanyl improved, although continue to be in pain, her problems been ongoing since February this year she is been scheduled for lithotripsy in May 4th.  Because of worsening pain she came back to our ER for further evaluation and management.   We will admit to control her severe left flank pain and consult urology. \"     4/22--> hemodynamically stable, she had a cystoscopy, left-sided ureteroscopy with holmium laser lithotripsy and left-sided stent placement done, tolerated well and was pain-free, she was eating and ambulating, urology note reviewed and they are okay with discharge and she can pull her stent in 7 days; she feels a lot better and wants to go home so she will be discharged home in stable condition.           Exam:     Vitals:  Vitals:    04/22/21 1440 04/22/21 1445 04/22/21 1500 04/22/21 1515   BP: 128/65 125/67 120/69 119/67   Pulse: 68 72 60 58   Resp: 13 8 12 14   Temp:   97.7 °F (36.5 °C)    TempSrc:   Oral    SpO2: 100% 100% 99% 98%   Weight:       Height:         Weight: Weight: 143 lb (64.9 kg)     24 hour intake/output:    Intake/Output Summary (Last 24 hours) at 4/23/2021 0725  Last data filed at 4/22/2021 1817  Gross per 24 hour   Intake 2080.5 ml   Output 651 ml   Net 1429.5 ml         General appearance:  No apparent distress, appears stated age and cooperative. HEENT:  Normal cephalic, atraumatic without obvious deformity. Pupils equal, round, and reactive to light. Conjunctivae/corneas clear. Neck: Supple, with full range of motion. No jugular venous distention. Trachea midline. Respiratory:  Normal respiratory effort. Clear to auscultation, bilaterally without Rales/Wheezes/Rhonchi. Cardiovascular:  Regular rate and rhythm with normal S1/S2 without murmurs, rubs or gallops. Abdomen: Soft, non-tender, non-distended with normal bowel sounds. Musculoskeletal:  No clubbing, cyanosis or edema bilaterally. Full range of motion without deformity. Skin: Skin color, texture, turgor normal.    Neurologic:  Neurovascularly intact without any focal sensory/motor deficits. Cranial nerves: II-XII intact, grossly non-focal.  Psychiatric:  Alert and oriented, thought content appropriate  Capillary Refill: Brisk,< 3 seconds   Peripheral Pulses: +2 palpable, equal bilaterally       Labs:  For convenience and continuity at follow-up the following most recent labs are provided:      CBC:    Lab Results   Component Value Date    WBC 9.2 04/22/2021    HGB 11.4 04/22/2021    HCT 35.5 04/22/2021     04/22/2021       Renal:    Lab Results   Component Value Date     04/22/2021    K 4.3 04/22/2021     04/22/2021    CO2 20 04/22/2021    BUN 11 04/22/2021    CREATININE 0.6 04/22/2021    CALCIUM 8.7 04/22/2021    PHOS 2.8 09/28/2019       Cardiac: No results for input(s): Rejeana Kemps in the last 72 hours. Significant Diagnostic Studies    Radiology:   CT ABDOMEN PELVIS WO CONTRAST Additional Contrast? None   Final Result       1. 5 mm stone in the left ureterovesicular junction with moderate left hydroureteronephrosis. 2. 3.4 cm right ovarian cystic lesion. Consider follow-up ultrasound in 6 weeks to ensure decrease in size. 3. Multiple hypodense lesions in the liver are nonspecific and not fully characterized. Consider multiphase contrast enhanced CT or MRI of the liver for further evaluation. **This report has been created using voice recognition software. It may contain minor errors which are inherent in voice recognition technology. **      Final report electronically signed by Dr. Donte Campos MD on 4/21/2021 5:18 PM      XR ABDOMEN (KUB) (SINGLE AP VIEW)   Final Result   No renal calculi identified. **This report has been created using voice recognition software. It may contain minor errors which are inherent in voice recognition technology. **      Final report electronically signed by Dr. Donte Campos MD on 4/21/2021 3:02 PM             Consults:     IP CONSULT TO UROLOGY    Disposition:    [x] Home       [] TCU       [] Rehab       [] Psych       [] SNF       [] [facility]       [] Other-    Condition at Discharge: Stable    Code Status:  Prior     Pending tests at discharge: none    Patient Instructions:    Discharge

## 2021-04-23 NOTE — TELEPHONE ENCOUNTER
[facility] Transitions Initial Follow Up Call    Call within 2 business days of discharge: Yes     Patient: Sarita Echols Patient : 1970 MRN: [id number]         Spoke with: Patient, she stated she was able to  all her scripts from the pharmacy. Patient is scheduled to see Dr. Kit Pathak on 2021.  Patient stated that there was nothing that she needed from our office at this time,    Blas Streeter RCP

## 2021-04-23 NOTE — CARE COORDINATION
[facility] Transitions Initial Follow Up Call    Call within 2 business days of discharge: Yes    Patient: Fayetta Alpers Patient : 1970   MRN: [id number]  Reason for Admission: Nephrolithiasis  Discharge Date: 21 RARS: Readmission Risk Score: 6      Last Discharge [address] [address]       Complaint Diagnosis Description Type Department Provider    21 Flank pain Nephrolithiasis ED to Hosp-Admission (Discharged) (ADMITTED) KITTY Hansen - MIKE; Zuleika Thompson... Facility: [facility]    Attempt #1 to contact patient for transitions call. Contact information left to  requesting call back at the earliest convenience. Follow Up  No future appointments.     Ady Degroot RN

## 2021-04-23 NOTE — CARE COORDINATION
Was this an external facility discharge? No Discharge Facility: [address] [place] [address]    Challenges to be reviewed by the provider   Additional needs identified to be addressed with provider No  none             Method of communication with provider : none      Was this a readmission? No  Patient stated reason for admission: Nephrolithiasis  Patients top risk factors for readmission: None    Care Transition Nurse (CTN) contacted the patient by telephone to perform post hospital discharge assessment. Verified name and  with patient as identifiers. Provided introduction to self, and explanation of the CTN role. CTN reviewed discharge instructions, medical action plan and red flags with patient who verbalized understanding. Patient given an opportunity to ask questions and does not have any further questions or concerns at this time. Were discharge instructions available to patient? Yes. Reviewed appropriate site of care based on symptoms and resources available to patient including: PCP and Specialist. The patient agrees to contact the PCP office for questions related to their healthcare. Medication reconciliation was performed with patient, who verbalizes understanding of administration of home medications. Advised obtaining a 90-day supply of all daily and as-needed medications. Covid Risk Education    Patient has following risk factors of: no known risk factors. Education provided regarding infection prevention, and signs and symptoms of COVID-19 and when to seek medical attention with patient who verbalized understanding. Discussed exposure protocols and quarantine From CDC: Are you at higher risk for severe illness?   and given an opportunity for questions and concerns. The patient agrees to contact the COVID-19 hotline [phone] or PCP office for questions related to COVID-19.      For more information on steps you can take to protect yourself, see CDC's How to Protect Yourself       Discussed follow-up appointments. If no appointment was previously scheduled, appointment scheduling offered: Yes. Is follow up appointment scheduled within 7 days of discharge? Plan for follow-up call in 5-7 days based on severity of symptoms and risk factors. Plan for next call: symptom management-pain, stent removal  CTN provided contact information for future needs. Sarika Edelson called back, stated she is doing OK, managing pain with Toradol, took one at bedtime and one @ 5 a.m. Stated she is urinating well, no BM since discharge. Pt is hydrated and eating well. Stated PCP office will contact her with appt, requested assistance with Urology f/u appt. Stated she was instructed to pull stent out in one week, f/u in 6-8 weeks. CTN called Urology office and left  requesting call to pt to schedule f/u appt. , updated patient.     Pricila Cisneros, RN BSN   Care Transitions Nurse  [phone]

## 2021-04-26 ENCOUNTER — OFFICE VISIT (OUTPATIENT)
Dept: FAMILY MEDICINE CLINIC | Age: 51
End: 2021-04-26
Payer: COMMERCIAL

## 2021-04-26 VITALS
SYSTOLIC BLOOD PRESSURE: 112 MMHG | BODY MASS INDEX: 23.53 KG/M2 | WEIGHT: 146.4 LBS | HEIGHT: 66 IN | HEART RATE: 82 BPM | TEMPERATURE: 99.1 F | DIASTOLIC BLOOD PRESSURE: 68 MMHG | RESPIRATION RATE: 16 BRPM

## 2021-04-26 DIAGNOSIS — N20.0 KIDNEY STONE: Primary | ICD-10-CM

## 2021-04-26 DIAGNOSIS — N83.201 RIGHT OVARIAN CYST: ICD-10-CM

## 2021-04-26 PROCEDURE — 99495 TRANSJ CARE MGMT MOD F2F 14D: CPT | Performed by: FAMILY MEDICINE

## 2021-04-26 PROCEDURE — 1111F DSCHRG MED/CURRENT MED MERGE: CPT | Performed by: FAMILY MEDICINE

## 2021-04-26 ASSESSMENT — PATIENT HEALTH QUESTIONNAIRE - PHQ9
SUM OF ALL RESPONSES TO PHQ QUESTIONS 1-9: 0
1. LITTLE INTEREST OR PLEASURE IN DOING THINGS: 0
SUM OF ALL RESPONSES TO PHQ QUESTIONS 1-9: 0
2. FEELING DOWN, DEPRESSED OR HOPELESS: 0

## 2021-04-26 ASSESSMENT — ENCOUNTER SYMPTOMS
ABDOMINAL DISTENTION: 1
RESPIRATORY NEGATIVE: 1

## 2021-04-26 NOTE — PROGRESS NOTES
Post-Discharge Transitional Care Management Services or Hospital Follow Up      Cori Crystal   YOB: 1970    Date of Office Visit:  4/26/2021  Date of Hospital Admission: 4/21/21  Date of Hospital Discharge: 4/22/21  Readmission Risk Score(high >=14%. Medium >=10%):Readmission Risk Score: 6      Care management risk score Rising risk (score 2-5) and Complex Care (Scores >=6): 0     Non face to face  following discharge, date last encounter closed (first attempt may have been earlier): 4/23/2021  1:18 PM 4/23/2021  1:18 PM    Call initiated 2 business days of discharge: Yes     Patient Active Problem List   Diagnosis    Left flank pain       No Known Allergies    Medications listed as ordered at the time of discharge from hospital   Josh Lillian, [address] [facility] Medication Instructions ALISSA:    Printed on:04/26/21 3105   Medication Information                      Cholecalciferol (VITAMIN D3) 2000 units CAPS  Take 1 capsule by mouth daily             ketorolac (TORADOL) 10 MG tablet  Take 1 tablet by mouth every 6 hours as needed for Pain             tamsulosin (FLOMAX) 0.4 MG capsule  Take 1 capsule by mouth daily                   Medications marked \"taking\" at this time  Outpatient Medications Marked as Taking for the 4/26/21 encounter (Office Visit) with Torres Samuel MD   Medication Sig Dispense Refill    ketorolac (TORADOL) 10 MG tablet Take 1 tablet by mouth every 6 hours as needed for Pain 20 tablet 0    tamsulosin (FLOMAX) 0.4 MG capsule Take 1 capsule by mouth daily 30 capsule 0    Cholecalciferol (VITAMIN D3) 2000 units CAPS Take 1 capsule by mouth daily          Medications patient taking as of now reconciled against medications ordered at time of hospital discharge: Yes    Chief Complaint   Patient presents with    Follow-Up from Hospital     Patient discharged on 4/22/2021 for kidney stone removal.     Other     Patient feels very bloated.        HPI     45yo female with h/o left PROCEDURE: CT ABDOMEN PELVIS WO CONTRAST       CLINICAL INFORMATION: left flank pain, history of nephrolith, not seen on kub .       COMPARISON: CT abdomen pelvis dated 2/8/2021.       TECHNIQUE: Axial 5 mm CT images were obtained through the abdomen and pelvis. No contrast was given. Coronal and sagittal reconstructions were created.       All CT scans at this facility use dose modulation, iterative reconstruction, and/or weight-based dosing when appropriate to reduce radiation dose to as low as reasonably achievable.       FINDINGS:    There are mild posterior dependent changes. Visualized portions of the lungs are otherwise clear. The visualized portion of the heart is unremarkable.       There are multiple hypodense lesions within the liver which are stable compared to prior exam and not fully characterized. The largest of these in the medial segment of the left lobe of liver measures 1.8 x 1.4 cm. The gallbladder is unremarkable. Adrenal glands are unremarkable. The spleen is unremarkable. The pancreas is unremarkable. No retroperitoneal or mesenteric lymphadenopathy is identified.       There is a 5 mm calcified stone within the bladder at the left ureterovesicular junction. There is moderate hydroureteronephrosis on the left. No other nephroliths are identified. There is mild perinephric fat stranding on the left.       The large bowel appears within normal limits. The appendix is normal in appearance. Small bowel also appears within normal limits. The bladder is nondistended. The uterus is unremarkable. There is a 3.4 x 3 cm hypodense cystic lesion in the right ovary. There is small free fluid in the pelvis. Visualized osseous structures appear intact without suspicious osseous lesion.           Impression       1. 5 mm stone in the left ureterovesicular junction with moderate left hydroureteronephrosis. 2. 3.4 cm right ovarian cystic lesion.  Consider follow-up ultrasound in 6 weeks to ensure decrease in size. 3. Multiple hypodense lesions in the liver are nonspecific and not fully characterized. Consider multiphase contrast enhanced CT or MRI of the liver for further evaluation.                   **This report has been created using voice recognition software. It may contain minor errors which are inherent in voice recognition technology. **       Final report electronically signed by Dr. Bhargav Rodriguez MD on 4/21/2021 5:18 PM               Assessment/Plan:    1. Kidney stone    S/p ureteroscopy and lithotripsy with stent placement. She will remove the stent this Thursday. Call for any problems. Continue flomax. Follow up with urology as planned. 2. Right ovarian cyst    Follow up pelvic US in 6 weeks. - US NON OB TRANSVAGINAL; Future        She will think about follow up liver testing and let me know how or when she would like to proceed.     Medical Decision Making: moderate complexity             Electronically signed by Ryan Camacho MD on 4/26/2021 at 5:09 PM

## 2021-04-28 ENCOUNTER — CARE COORDINATION (OUTPATIENT)
Dept: CASE MANAGEMENT | Age: 51
End: 2021-04-28

## 2021-04-28 NOTE — CARE COORDINATION
[facility] Transitions Follow Up Call    2021    Patient: Sarah Browning  Patient : 1970   MRN: [id number]  Reason for Admission: Left Ureteral Stent  Discharge Date: 21 RARS: Readmission Risk Score: 6         Attempted to contact patient for follow up Care Transition call. Left HIPAA Compliant message on Voice Mail to call office (number given) with any questions and an update on patient's condition since discharge. Will continue to follow. Brii Calvillo LPN    [phone]  [facility] / [address] [place] [address] Transitions Subsequent and Final Call    Subsequent and Final Calls  Care Transitions Interventions  Other Interventions:            Follow Up  Future Appointments   Date Time Provider Edson Lugo   2021 10:15 AM LORENE Paul 4724   2021  8:30 AM STR ULTRASOUND RM 2 STRZ US STR Radiolog       Brii Calvillo LPN

## 2021-05-06 ENCOUNTER — CARE COORDINATION (OUTPATIENT)
Dept: CASE MANAGEMENT | Age: 51
End: 2021-05-06

## 2021-05-06 NOTE — CARE COORDINATION
[facility] Transitions Follow Up Call    2021    Patient: Kassandra De Leon  Patient : 1970   MRN: <[id number]>  Reason for Admission:  Left Ureteral Stent  Discharge Date: 21 RARS: Readmission Risk Score: 6         Spoke with: Called to speak with patient for update with transition of care. Left HIPPA compliant voice message with contact information [phone] for a call  Back with an update. Care Transitions Subsequent and Final Call    Subsequent and Final Calls  Care Transitions Interventions  Other Interventions:            Follow Up  Future Appointments   Date Time Provider Edson Lugo   2021 10:15 AM Cheri Nichols PA-C [address] [facility]   2021  8:30 AM STR ULTRASOUND RM 2 STRZ US STR Radiolog       Nicole Zavala LPN

## 2021-05-18 ENCOUNTER — HOSPITAL ENCOUNTER (OUTPATIENT)
Age: 51
Discharge: HOME | End: 2021-05-18
Payer: COMMERCIAL

## 2021-05-18 ENCOUNTER — HOSPITAL ENCOUNTER (OUTPATIENT)
Dept: GENERAL RADIOLOGY | Age: 51
Discharge: HOME | End: 2021-05-18
Payer: COMMERCIAL

## 2021-05-18 DIAGNOSIS — N20.1 URETERAL STONE: ICD-10-CM

## 2021-05-18 PROCEDURE — 74018 RADEX ABDOMEN 1 VIEW: CPT

## 2021-05-20 ENCOUNTER — OFFICE VISIT (OUTPATIENT)
Dept: UROLOGY | Age: 51
End: 2021-05-20
Payer: COMMERCIAL

## 2021-05-20 VITALS
HEIGHT: 66 IN | DIASTOLIC BLOOD PRESSURE: 70 MMHG | SYSTOLIC BLOOD PRESSURE: 116 MMHG | BODY MASS INDEX: 23.3 KG/M2 | WEIGHT: 145 LBS

## 2021-05-20 DIAGNOSIS — N20.1 URETERAL STONE: Primary | ICD-10-CM

## 2021-05-20 DIAGNOSIS — R31.29 MICROSCOPIC HEMATURIA: ICD-10-CM

## 2021-05-20 LAB
BACTERIA: ABNORMAL
BILIRUBIN URINE: NEGATIVE
BILIRUBIN URINE: NEGATIVE
BLOOD URINE, POC: NORMAL
BLOOD, URINE: ABNORMAL
CASTS: ABNORMAL /LPF
CASTS: ABNORMAL /LPF
CHARACTER, URINE: CLEAR
CHARACTER, URINE: CLEAR
COLOR, URINE: YELLOW
COLOR: YELLOW
CRYSTALS: ABNORMAL
EPITHELIAL CELLS, UA: ABNORMAL /HPF
GLUCOSE URINE: NEGATIVE MG/DL
GLUCOSE, URINE: NEGATIVE MG/DL
KETONES, URINE: NEGATIVE
KETONES, URINE: NEGATIVE
LEUKOCYTE CLUMPS, URINE: NEGATIVE
LEUKOCYTE ESTERASE, URINE: NEGATIVE
MISCELLANEOUS LAB TEST RESULT: ABNORMAL
NITRITE, URINE: NEGATIVE
NITRITE, URINE: NEGATIVE
PH UA: 6 (ref 5–9)
PH, URINE: 5 (ref 5–9)
PROTEIN UA: NEGATIVE MG/DL
PROTEIN, URINE: NEGATIVE MG/DL
RBC URINE: ABNORMAL /HPF
RENAL EPITHELIAL, UA: ABNORMAL
SPECIFIC GRAVITY UA: 1.01 (ref 1–1.03)
SPECIFIC GRAVITY, URINE: 1.01 (ref 1–1.03)
UROBILINOGEN, URINE: 0.2 EU/DL (ref 0–1)
UROBILINOGEN, URINE: 0.2 EU/DL (ref 0–1)
WBC UA: ABNORMAL /HPF
YEAST: ABNORMAL

## 2021-05-20 PROCEDURE — 3017F COLORECTAL CA SCREEN DOC REV: CPT | Performed by: PHYSICIAN ASSISTANT

## 2021-05-20 PROCEDURE — 1036F TOBACCO NON-USER: CPT | Performed by: PHYSICIAN ASSISTANT

## 2021-05-20 PROCEDURE — 99213 OFFICE O/P EST LOW 20 MIN: CPT | Performed by: PHYSICIAN ASSISTANT

## 2021-05-20 PROCEDURE — 81003 URINALYSIS AUTO W/O SCOPE: CPT | Performed by: PHYSICIAN ASSISTANT

## 2021-05-20 PROCEDURE — G8420 CALC BMI NORM PARAMETERS: HCPCS | Performed by: PHYSICIAN ASSISTANT

## 2021-05-20 PROCEDURE — 1111F DSCHRG MED/CURRENT MED MERGE: CPT | Performed by: PHYSICIAN ASSISTANT

## 2021-05-20 PROCEDURE — G8427 DOCREV CUR MEDS BY ELIG CLIN: HCPCS | Performed by: PHYSICIAN ASSISTANT

## 2021-05-20 NOTE — PROGRESS NOTES
Ms. Thao Warren is a 47-year-old female with a history of nephrolithiasis. She is status post cystoscopy with left ureteroscopy, laser lithotripsy, left ureteral stent placement on 4/22/21. She pulled her stent seven days after her discharge. She states that since the procedure, she has been doing well. She does report an occasional, brief twinge or pain in her left flank area. This discomfort last a few seconds and almost seems like a spasm. There are no aggravating or alleviating factors. No associated symptoms. She denies gross hematuria, dysuria, urinary urgency, frequency, weakened stream, or urinary retention. She is not taking any medication for these episodes of brief discomfort. In regards to her general medical health, she denies dyspnea, chest pain, N/V, leg pain, or lightheadedness. She presents today for post-operative evaluation.      Past Medical History:   Diagnosis Date    Hyperlipidemia        Past Surgical History:   Procedure Laterality Date    BACK SURGERY      cervical neck surgery    CYSTOSCOPY Left 4/22/2021    CYSTOSCOPY, LEFT URETEROSCOPY LASER, BASKET RETRIEVAL OF STONES WITH LEFT URETERAL STENT PLACEMENT performed by Chano Mcdonnell MD at [address] [facility] OF UTERUS  10/31/2017    DILATION AND CURETTAGE OF UTERUS N/A 10/31/2017    DILATATION AND CURETTAGE HYSTEROSCOPY, AND ENDOMETRIAL ABLATION performed by Elizabet Hurt MD at [address] [facility]  10/31/2018    Dr Phelan [facility] HYSTEROSCOPY  10/31/2017    D/c amd emdometrial ablation   [address]       Current Outpatient Medications on File Prior to Visit   Medication Sig Dispense Refill    Cholecalciferol (VITAMIN D3) 2000 units CAPS Take 1 capsule by mouth daily      ketorolac (TORADOL) 10 MG tablet Take 1 tablet by mouth every 6 hours as needed for Pain (Patient not taking: Reported on 5/20/2021) 20 tablet 0    tamsulosin (FLOMAX) 0.4 MG capsule Take 1 capsule by mouth daily 30 capsule 0     No current facility-administered medications on file prior to visit.        No Known Allergies    Family History   Problem Relation Age of Onset    High Cholesterol Father     Cancer Father         prostate    Early Death Father     Prostate Cancer Father     High Cholesterol Mother     Cancer Paternal Uncle         pancreatic    Early Death Paternal Uncle     Arthritis Maternal Grandmother     Diabetes Maternal Grandmother     Cancer Maternal Grandfather         ?colon    Cancer Paternal Grandfather     Early Death Paternal Grandfather     Cancer Paternal Uncle         prostate    Prostate Cancer Paternal Uncle     Asthma Neg Hx     Birth Defects Neg Hx     Depression Neg Hx     Hearing Loss Neg Hx     Heart Disease Neg Hx     High Blood Pressure Neg Hx     Kidney Disease Neg Hx     Learning Disabilities Neg Hx     Mental Illness Neg Hx     Mental Retardation Neg Hx     Miscarriages / Stillbirths Neg Hx     Stroke Neg Hx     Substance Abuse Neg Hx     Vision Loss Neg Hx     Other Neg Hx     Alcohol Abuse Neg Hx     Allergy (Severe) Neg Hx     Anemia Neg Hx     Arrhythmia Neg Hx     Atrial Fibrillation Neg Hx     Coronary Art Dis Neg Hx     Colon Cancer Neg Hx     Heart Attack Neg Hx     Obesity Neg Hx     Osteoporosis Neg Hx        Social History     Socioeconomic History    Marital status:      Spouse name: Lm Reyes Number of children: 3    Years of education: Not on file    Highest education level: Not on file   Occupational History    Occupation: nurse   Tobacco Use    Smoking status: Never Smoker    Smokeless tobacco: Never Used   Vaping Use    Vaping Use: Never used   Substance and Sexual Activity    Alcohol use: Yes     Comment: social    Drug use: No    Sexual activity: Yes     Partners: Male   Other Topics Concern    Not on file   Social History Narrative    Not on file     Social Determinants of Health     Financial Resource Strain:     Difficulty of Paying Living Expenses:    Food Insecurity:     Worried About Running Out of Food in the Last Year:     [address] in the Last Year:    Transportation Needs:     Lack of Transportation (Medical):  Lack of Transportation (Non-Medical):    Physical Activity:     Days of Exercise per Week:     Minutes of Exercise per Session:    Stress:     Feeling of Stress :    Social Connections:     Frequency of Communication with Friends and Family:     Frequency of Social Gatherings with Friends and Family:     Attends [employer] Services:     Active Member of Clubs or Organizations:     Attends Club or Organization Meetings:     Marital Status:    Intimate Partner Violence:     Fear of Current or Ex-Partner:     Emotionally Abused:     Physically Abused:     Sexually Abused:        Review of Systems  No problems with ears, nose or throat. No problems with eyes. No chest pain, shortness of breath, abdominal pain, extremity pain or weakness, and no neurological deficits. No rashes. No swollen glands or lymph nodes.  symptoms per HPI. The remainder of the review of symptoms is negative. Exam    /70   Ht 5' 6\" (1.676 m)   Wt 145 lb (65.8 kg)   BMI 23.40 kg/m²     Constitutional: Oriented to person, place, and time. Vital signs are normal. Appears well-developed and well-nourished. Cooperative. No distress. HENT:    Head: Normocephalic and atraumatic.    Eyes: EOM are normal. Pupils are equal, round, and reactive to light. Right eye exhibits no discharge. Left eye exhibits no discharge. No scleral icterus. Neck: Trachea normal. No JVD present. Cardiovascular: Normal rate and regular rhythm. S1 and S2 normal.  Pulmonary/Chest: Effort normal. No respiratory distress. No wheezes, rhonchi, or rales. Abdominal: Soft. Exhibits no distension or generalized tenderness.  There is no rebound, rigidity, or guarding. No CVA tenderness. Musculoskeletal: No pitting edema or calf tenderness. Neurological: Alert and oriented to person, place, and time. No cranial nerve deficit. Skin: Skin is warm and dry. Not diaphoretic. Psychiatric: Normal mood and affect. Behavior is normal.   Nursing note and vitals reviewed. Labs    Results for POC orders placed in visit on 05/20/21   POCT Urinalysis No Micro (Auto)   Result Value Ref Range    Glucose, Ur Negative NEGATIVE mg/dl    Bilirubin Urine Negative     Ketones, Urine Negative NEGATIVE    Specific Gravity, Urine 1.010 1.002 - 1.030    Blood, UA POC Trace-lysed NEGATIVE    pH, Urine 5.00 5.0 - 9.0    Protein, Urine Negative NEGATIVE mg/dl    Urobilinogen, Urine 0.20 0.0 - 1.0 eu/dl    Nitrite, Urine Negative NEGATIVE    Leukocyte Clumps, Urine Negative NEGATIVE    Color, Urine Yellow YELLOW-STRAW    Character, Urine Clear [id number].[id number]       Lab Results   Component Value Date    CREATININE 0.6 04/22/2021    BUN 11 04/22/2021     04/22/2021    K 4.3 04/22/2021     04/22/2021    CO2 20 (L) 04/22/2021     KUB (5/18/21): FINDINGS:    POSTOPERATIVE CHANGES: None.       LINES/TUBES/MECHANICAL DEVICES: None.       BOWEL GAS PATTERN:   1.  Nonobstructive bowel gas pattern.       LUNG BASES: Unremarkable.       MASS SHADOWS: None.       PATHOLOGIC CALCIFICATIONS: None.       OSSEOUS STRUCTURES:    1. Chronic appearing deformity of the pelvis.       OTHER: None.               Impression   1. Unremarkable AP supine abdomen, specifically previously identified 5 mm left UPJ calculus is not identified radiographically. Plan:  1. Nephrolithiasis- Status post cystoscopy with left ureteroscopy, laser lithotripsy, left ureteral stent placement on 4/22/21. Left ureteral stent has been removed. Occasional, brief episodes of flank pain, lasting a few seconds at a time. This should decrease in frequency as ureteral inflammation subsides.  Patient did not have this sensation until her stent was pulled. She does not wish to have any further imaging at this time. Recommend Tera Milan in the next two to three months if possible to try to decrease stone formation. Will send urine for micro today. 2. Right Ovarian Cyst- Transvaginal US has been ordered by Dr. Jesús Pablo for further delineation. 3. Liver lesion- Multiple hypodense lesions noted on 4/21 CT abdomen and pelvis without contrast. Highly recommend MRI of the liver with and without contrast for further delineation. Patient will consider and let me know when she would like to pursue. Patient would like to follow-up as needed.

## 2021-05-21 ENCOUNTER — TELEPHONE (OUTPATIENT)
Dept: UROLOGY | Age: 51
End: 2021-05-21

## 2021-05-21 DIAGNOSIS — R31.29 MICROSCOPIC HEMATURIA: Primary | ICD-10-CM

## 2021-05-21 NOTE — TELEPHONE ENCOUNTER
Patient advised of the urine results and voiced understanding to repeat the urine in 6 weeks. Order sent via mail.

## 2021-06-08 ENCOUNTER — HOSPITAL ENCOUNTER (OUTPATIENT)
Dept: ULTRASOUND IMAGING | Age: 51
Discharge: HOME | End: 2021-06-08
Payer: COMMERCIAL

## 2021-06-08 DIAGNOSIS — N83.201 RIGHT OVARIAN CYST: ICD-10-CM

## 2021-06-08 PROCEDURE — 76830 TRANSVAGINAL US NON-OB: CPT

## 2021-07-14 DIAGNOSIS — R31.29 MICROSCOPIC HEMATURIA: ICD-10-CM

## 2021-07-14 LAB
BACTERIA: ABNORMAL
BILIRUBIN URINE: NEGATIVE
BLOOD, URINE: ABNORMAL
CASTS: ABNORMAL /LPF
CASTS: ABNORMAL /LPF
CHARACTER, URINE: CLEAR
COLOR: YELLOW
CRYSTALS: ABNORMAL
EPITHELIAL CELLS, UA: ABNORMAL /HPF
GLUCOSE, URINE: NEGATIVE MG/DL
KETONES, URINE: NEGATIVE
LEUKOCYTE ESTERASE, URINE: NEGATIVE
MISCELLANEOUS LAB TEST RESULT: ABNORMAL
NITRITE, URINE: NEGATIVE
PH UA: 5 (ref 5–9)
PROTEIN UA: NEGATIVE MG/DL
RBC URINE: ABNORMAL /HPF
RENAL EPITHELIAL, UA: ABNORMAL
SPECIFIC GRAVITY UA: 1.01 (ref 1–1.03)
UROBILINOGEN, URINE: 0.2 EU/DL (ref 0–1)
WBC UA: ABNORMAL /HPF
YEAST: ABNORMAL

## 2021-07-16 ENCOUNTER — TELEPHONE (OUTPATIENT)
Dept: UROLOGY | Age: 51
End: 2021-07-16

## 2021-07-16 DIAGNOSIS — R31.29 MICROSCOPIC HEMATURIA: Primary | ICD-10-CM

## 2021-07-16 NOTE — TELEPHONE ENCOUNTER
Patient advised urine was positive for microscopic blood and its recommended to give a urine sample for cytology. She voiced understanding. Order sent via mail.

## 2021-07-19 ENCOUNTER — TELEPHONE (OUTPATIENT)
Dept: FAMILY MEDICINE CLINIC | Age: 51
End: 2021-07-19

## 2021-07-19 DIAGNOSIS — K76.9 LIVER LESION: Primary | ICD-10-CM

## 2021-07-19 NOTE — TELEPHONE ENCOUNTER
Pt called stating that she is ready to schedule the liver testing that was recommended to her by CG at her last appt on 4/26/21. Would like it done at [facility] on a Tuesday or Friday (not the last week in July). Please advise.      SAVE JULITO RAMIREZ

## 2021-07-20 ENCOUNTER — NURSE ONLY (OUTPATIENT)
Dept: LAB | Age: 51
End: 2021-07-20

## 2021-07-20 DIAGNOSIS — R31.29 MICROSCOPIC HEMATURIA: ICD-10-CM

## 2021-07-22 ENCOUNTER — TELEPHONE (OUTPATIENT)
Dept: UROLOGY | Age: 51
End: 2021-07-22

## 2021-07-22 DIAGNOSIS — R31.29 MICROSCOPIC HEMATURIA: Primary | ICD-10-CM

## 2021-07-23 ENCOUNTER — HOSPITAL ENCOUNTER (OUTPATIENT)
Dept: CT IMAGING | Age: 51
Discharge: HOME | End: 2021-07-23
Payer: COMMERCIAL

## 2021-07-23 DIAGNOSIS — K76.9 LIVER LESION: ICD-10-CM

## 2021-07-23 PROCEDURE — 74160 CT ABDOMEN W/CONTRAST: CPT

## 2021-07-23 PROCEDURE — 6360000004 HC RX CONTRAST MEDICATION: Performed by: FAMILY MEDICINE

## 2021-07-23 RX ADMIN — IOPAMIDOL 85 ML: 755 INJECTION, SOLUTION INTRAVENOUS at 10:59

## 2021-07-23 RX ADMIN — IOHEXOL 25 ML: 240 INJECTION, SOLUTION INTRATHECAL; INTRAVASCULAR; INTRAVENOUS; ORAL at 10:59

## 2021-09-23 ENCOUNTER — TELEPHONE (OUTPATIENT)
Dept: FAMILY MEDICINE CLINIC | Age: 51
End: 2021-09-23

## 2021-09-23 ENCOUNTER — NURSE ONLY (OUTPATIENT)
Dept: FAMILY MEDICINE CLINIC | Age: 51
End: 2021-09-23
Payer: COMMERCIAL

## 2021-09-23 PROCEDURE — 96372 THER/PROPH/DIAG INJ SC/IM: CPT | Performed by: NURSE PRACTITIONER

## 2021-09-23 RX ORDER — METHYLPREDNISOLONE ACETATE 80 MG/ML
80 INJECTION, SUSPENSION INTRA-ARTICULAR; INTRALESIONAL; INTRAMUSCULAR; SOFT TISSUE ONCE
Status: CANCELLED | OUTPATIENT
Start: 2021-09-23 | End: 2021-09-23

## 2021-09-23 RX ORDER — METHYLPREDNISOLONE ACETATE 80 MG/ML
80 INJECTION, SUSPENSION INTRA-ARTICULAR; INTRALESIONAL; INTRAMUSCULAR; SOFT TISSUE ONCE
Status: COMPLETED | OUTPATIENT
Start: 2021-09-23 | End: 2021-09-23

## 2021-09-23 RX ORDER — METHYLPREDNISOLONE ACETATE 80 MG/ML
80 INJECTION, SUSPENSION INTRA-ARTICULAR; INTRALESIONAL; INTRAMUSCULAR; SOFT TISSUE DAILY
Status: CANCELLED
Start: 2021-09-23

## 2021-09-23 RX ADMIN — METHYLPREDNISOLONE ACETATE 80 MG: 80 INJECTION, SUSPENSION INTRA-ARTICULAR; INTRALESIONAL; INTRAMUSCULAR; SOFT TISSUE at 13:29

## 2021-09-23 NOTE — PROGRESS NOTES
Administrations This Visit     methylPREDNISolone acetate (DEPO-MEDROL) injection 80 mg     Admin Date  09/23/2021  13:29 Action  Given Dose  80 mg Route  IntraMUSCular Site  Dorsogluteal Right Administered By  Handy Carcamo, [address] [place] [address]    Ordering Provider: Katia Zamora MD    NDC: 2709-8073-31    Lot#: [id number]    : Yashira Peralta.     Patient Supplied?: No

## 2021-09-29 ENCOUNTER — TELEPHONE (OUTPATIENT)
Dept: FAMILY MEDICINE CLINIC | Age: 51
End: 2021-09-29

## 2021-09-29 RX ORDER — PREDNISONE 10 MG/1
TABLET ORAL
Qty: 30 TABLET | Refills: 0 | Status: SHIPPED | OUTPATIENT
Start: 2021-09-29

## 2021-09-29 NOTE — TELEPHONE ENCOUNTER
Rx EP'd to pharmacy. Please notify patient.       Requested Prescriptions     Signed Prescriptions Disp Refills    predniSONE (DELTASONE) 10 MG tablet 30 tablet 0     Sig: Take 4 po qd x 3 days, then 3 po qd x 3 days, then 2 po qd x 3 days, then 1 po qd x 3 days     Authorizing Provider: Eliud Saucedo     Needs appt if not improved      Electronically signed by Emil Luna MD on 9/29/2021 at 2:33 PM

## 2021-09-29 NOTE — TELEPHONE ENCOUNTER
Pt was given DepoMedrol 80mg IM on 09/23/2021, she is still having new spots of poison ivy. ..face, chest, arms, legs. She is taking Xyzal in the mornings but by noon the itching returns. Oral medications? VV?  Please advise

## 2022-10-25 ENCOUNTER — HOSPITAL ENCOUNTER (OUTPATIENT)
Dept: MAMMOGRAPHY | Age: 52
Discharge: HOME | End: 2022-10-25
Payer: COMMERCIAL

## 2022-10-25 DIAGNOSIS — Z12.31 VISIT FOR SCREENING MAMMOGRAM: ICD-10-CM

## 2022-10-25 PROCEDURE — 77067 SCR MAMMO BI INCL CAD: CPT

## 2023-03-23 ENCOUNTER — NURSE ONLY (OUTPATIENT)
Dept: LAB | Age: 53
End: 2023-03-23

## 2023-04-03 LAB — CYTOLOGY THIN PREP PAP: NORMAL

## 2023-10-09 ENCOUNTER — TELEPHONE (OUTPATIENT)
Dept: FAMILY MEDICINE CLINIC | Age: 53
End: 2023-10-09

## 2023-10-09 NOTE — TELEPHONE ENCOUNTER
Informed patient she confirms she is following a low fat diet but does not excersie much. She is unsure about starting medication at this time and confirmed she stopped taking vitamin d about 6-9 months ago. Will discuss further at upcoming annual 10/31/23.

## 2023-10-09 NOTE — TELEPHONE ENCOUNTER
----- Message from Joe Queen MD sent at 10/8/2023  4:19 PM EDT -----  Please notify Amy Rodriguez that her community screening labs show markedly elevated cholesterol that is worse compared to previous. See if she's following a lowfat diet and exercising. If so, would recommend starting medication. See if she's agreeable to this. Her vitamin D level is still slightly low. See how much Vitamin D she's currently taking.  CG

## 2023-10-30 PROBLEM — E78.49 OTHER HYPERLIPIDEMIA: Status: ACTIVE | Noted: 2023-10-30

## 2023-10-30 PROBLEM — R10.9 LEFT FLANK PAIN: Status: RESOLVED | Noted: 2021-04-21 | Resolved: 2023-10-30

## 2023-10-30 ASSESSMENT — PATIENT HEALTH QUESTIONNAIRE - PHQ9
SUM OF ALL RESPONSES TO PHQ QUESTIONS 1-9: 0
1. LITTLE INTEREST OR PLEASURE IN DOING THINGS: 0
SUM OF ALL RESPONSES TO PHQ QUESTIONS 1-9: 0
1. LITTLE INTEREST OR PLEASURE IN DOING THINGS: NOT AT ALL
SUM OF ALL RESPONSES TO PHQ9 QUESTIONS 1 & 2: 0
SUM OF ALL RESPONSES TO PHQ QUESTIONS 1-9: 0
SUM OF ALL RESPONSES TO PHQ QUESTIONS 1-9: 0
2. FEELING DOWN, DEPRESSED OR HOPELESS: 0
2. FEELING DOWN, DEPRESSED OR HOPELESS: NOT AT ALL
SUM OF ALL RESPONSES TO PHQ9 QUESTIONS 1 & 2: 0

## 2023-10-31 ENCOUNTER — OFFICE VISIT (OUTPATIENT)
Dept: FAMILY MEDICINE CLINIC | Age: 53
End: 2023-10-31
Payer: COMMERCIAL

## 2023-10-31 VITALS
SYSTOLIC BLOOD PRESSURE: 120 MMHG | DIASTOLIC BLOOD PRESSURE: 80 MMHG | WEIGHT: 144 LBS | OXYGEN SATURATION: 95 % | BODY MASS INDEX: 23.14 KG/M2 | HEART RATE: 70 BPM | HEIGHT: 66 IN

## 2023-10-31 DIAGNOSIS — E78.49 OTHER HYPERLIPIDEMIA: ICD-10-CM

## 2023-10-31 DIAGNOSIS — Z23 NEED FOR TDAP VACCINATION: ICD-10-CM

## 2023-10-31 DIAGNOSIS — Z00.00 ANNUAL PHYSICAL EXAM: Primary | ICD-10-CM

## 2023-10-31 DIAGNOSIS — Z12.31 ENCOUNTER FOR SCREENING MAMMOGRAM FOR MALIGNANT NEOPLASM OF BREAST: ICD-10-CM

## 2023-10-31 PROCEDURE — 90471 IMMUNIZATION ADMIN: CPT | Performed by: FAMILY MEDICINE

## 2023-10-31 PROCEDURE — 90715 TDAP VACCINE 7 YRS/> IM: CPT | Performed by: FAMILY MEDICINE

## 2023-10-31 PROCEDURE — G8484 FLU IMMUNIZE NO ADMIN: HCPCS | Performed by: FAMILY MEDICINE

## 2023-10-31 PROCEDURE — 99396 PREV VISIT EST AGE 40-64: CPT | Performed by: FAMILY MEDICINE

## 2023-10-31 RX ORDER — VITAMIN E 268 MG
CAPSULE ORAL
COMMUNITY
Start: 2023-10-16

## 2023-10-31 SDOH — ECONOMIC STABILITY: FOOD INSECURITY: WITHIN THE PAST 12 MONTHS, YOU WORRIED THAT YOUR FOOD WOULD RUN OUT BEFORE YOU GOT MONEY TO BUY MORE.: NEVER TRUE

## 2023-10-31 SDOH — ECONOMIC STABILITY: HOUSING INSECURITY
IN THE LAST 12 MONTHS, WAS THERE A TIME WHEN YOU DID NOT HAVE A STEADY PLACE TO SLEEP OR SLEPT IN A SHELTER (INCLUDING NOW)?: NO

## 2023-10-31 SDOH — ECONOMIC STABILITY: FOOD INSECURITY: WITHIN THE PAST 12 MONTHS, THE FOOD YOU BOUGHT JUST DIDN'T LAST AND YOU DIDN'T HAVE MONEY TO GET MORE.: NEVER TRUE

## 2023-10-31 SDOH — ECONOMIC STABILITY: INCOME INSECURITY: HOW HARD IS IT FOR YOU TO PAY FOR THE VERY BASICS LIKE FOOD, HOUSING, MEDICAL CARE, AND HEATING?: NOT HARD AT ALL

## 2023-10-31 ASSESSMENT — ENCOUNTER SYMPTOMS
EYES NEGATIVE: 1
NAUSEA: 0
VOMITING: 0
ABDOMINAL PAIN: 0
SHORTNESS OF BREATH: 0
DIARRHEA: 0
BLOOD IN STOOL: 0

## 2023-12-12 ENCOUNTER — HOSPITAL ENCOUNTER (OUTPATIENT)
Dept: MAMMOGRAPHY | Age: 53
Discharge: HOME | End: 2023-12-12
Attending: FAMILY MEDICINE
Payer: COMMERCIAL

## 2023-12-12 VITALS — WEIGHT: 143.96 LBS | BODY MASS INDEX: 23.14 KG/M2 | HEIGHT: 66 IN

## 2023-12-12 DIAGNOSIS — Z12.31 ENCOUNTER FOR SCREENING MAMMOGRAM FOR MALIGNANT NEOPLASM OF BREAST: ICD-10-CM

## 2023-12-12 PROCEDURE — 77063 BREAST TOMOSYNTHESIS BI: CPT

## 2024-04-06 ENCOUNTER — HOSPITAL ENCOUNTER (EMERGENCY)
Age: 54
Discharge: HOME | End: 2024-04-06
Attending: EMERGENCY MEDICINE
Payer: COMMERCIAL

## 2024-04-06 ENCOUNTER — APPOINTMENT (OUTPATIENT)
Dept: GENERAL RADIOLOGY | Age: 54
End: 2024-04-06
Payer: COMMERCIAL

## 2024-04-06 VITALS
DIASTOLIC BLOOD PRESSURE: 62 MMHG | SYSTOLIC BLOOD PRESSURE: 117 MMHG | BODY MASS INDEX: 23.24 KG/M2 | HEIGHT: 66 IN | OXYGEN SATURATION: 98 % | HEART RATE: 82 BPM | TEMPERATURE: 97.9 F | RESPIRATION RATE: 18 BRPM

## 2024-04-06 DIAGNOSIS — S93.402A SPRAIN OF LEFT ANKLE, UNSPECIFIED LIGAMENT, INITIAL ENCOUNTER: Primary | ICD-10-CM

## 2024-04-06 PROCEDURE — 73610 X-RAY EXAM OF ANKLE: CPT

## 2024-04-06 PROCEDURE — 99283 EMERGENCY DEPT VISIT LOW MDM: CPT

## 2024-04-06 PROCEDURE — 6370000000 HC RX 637 (ALT 250 FOR IP): Performed by: EMERGENCY MEDICINE

## 2024-04-06 RX ORDER — HYDROCODONE BITARTRATE AND ACETAMINOPHEN 5; 325 MG/1; MG/1
1 TABLET ORAL EVERY 6 HOURS PRN
Qty: 12 TABLET | Refills: 0 | Status: SHIPPED | OUTPATIENT
Start: 2024-04-06 | End: 2024-04-09

## 2024-04-06 RX ORDER — OXYCODONE HYDROCHLORIDE AND ACETAMINOPHEN 5; 325 MG/1; MG/1
1 TABLET ORAL ONCE
Status: COMPLETED | OUTPATIENT
Start: 2024-04-06 | End: 2024-04-06

## 2024-04-06 RX ADMIN — OXYCODONE HYDROCHLORIDE AND ACETAMINOPHEN 1 TABLET: 5; 325 TABLET ORAL at 19:32

## 2024-04-06 ASSESSMENT — PAIN DESCRIPTION - LOCATION: LOCATION: ANKLE

## 2024-04-06 ASSESSMENT — ENCOUNTER SYMPTOMS
DIARRHEA: 0
SORE THROAT: 0
NAUSEA: 0
EYE PAIN: 0
RHINORRHEA: 0
CHOKING: 0
CHEST TIGHTNESS: 0
EYE REDNESS: 0
VOMITING: 0
ABDOMINAL PAIN: 0
ABDOMINAL DISTENTION: 0
BACK PAIN: 0
EYE DISCHARGE: 0
SHORTNESS OF BREATH: 0
EYE ITCHING: 0
COUGH: 0
SINUS PRESSURE: 0
BLOOD IN STOOL: 0
CONSTIPATION: 0
VOICE CHANGE: 0
WHEEZING: 0
PHOTOPHOBIA: 0

## 2024-04-06 ASSESSMENT — PAIN - FUNCTIONAL ASSESSMENT: PAIN_FUNCTIONAL_ASSESSMENT: 0-10

## 2024-04-06 ASSESSMENT — PAIN DESCRIPTION - ORIENTATION: ORIENTATION: LEFT

## 2024-04-06 ASSESSMENT — PAIN DESCRIPTION - PAIN TYPE: TYPE: ACUTE PAIN

## 2024-04-06 ASSESSMENT — PAIN DESCRIPTION - FREQUENCY: FREQUENCY: CONTINUOUS

## 2024-04-06 ASSESSMENT — PAIN SCALES - GENERAL: PAINLEVEL_OUTOF10: 10

## 2024-04-06 NOTE — ED PROVIDER NOTES
[facility]  eMERGENCY dEPARTMENT eNCOUnter          CHIEF COMPLAINT       Chief Complaint   Patient presents with    Ankle Injury     left       Nurses Notes reviewed and I agree except as noted in the HPI.      HISTORY OF PRESENT ILLNESS    Pinky Eid is a 53 y.o. female who presents for left ankle pain.  Patient was apparently a sack race and had a family gathering, at the end of this x-ray she apparently twisted her ankle.  Patient states he was unable to bear weight on it after that.  Patient stated started swelling.  Patient is swelling along the lateral malleolus.  She is able to move her toes.  She has no foot pain.  Patient has good sensation at the toes.  Patient is otherwise resting comfortably on the cot no apparent distress no other physical complaints at this time.    REVIEW OF SYSTEMS     Review of Systems   Constitutional:  Negative for activity change, appetite change, diaphoresis, fatigue and unexpected weight change.   HENT:  Negative for congestion, ear discharge, ear pain, hearing loss, rhinorrhea, sinus pressure, sore throat, trouble swallowing and voice change.    Eyes:  Negative for photophobia, pain, discharge, redness and itching.   Respiratory:  Negative for cough, choking, chest tightness, shortness of breath and wheezing.    Cardiovascular:  Negative for chest pain, palpitations and leg swelling.   Gastrointestinal:  Negative for abdominal distention, abdominal pain, blood in stool, constipation, diarrhea, nausea and vomiting.   Endocrine: Negative for polydipsia, polyphagia and polyuria.   Genitourinary:  Negative for decreased urine volume, difficulty urinating, dysuria, enuresis, frequency, hematuria, urgency, vaginal bleeding and vaginal discharge.   Musculoskeletal:  Positive for arthralgias, joint swelling and myalgias. Negative for back pain, gait problem, neck pain and neck stiffness.   Skin:  Negative for pallor and rash.   Allergic/Immunologic: Negative for      DISPOSITION/PLAN   Discharge    PATIENT REFERRED TO:  Hernan Narayanan, GIAN  [address] [facility] [address]  [phone]    Call in 2 days      [facility]  [address]  [place] [address]  [phone]  Call in 2 days      Bell Erickson MD  [address]  [place] [id number]  [phone]    Call in 2 days        DISCHARGE MEDICATIONS:  New Prescriptions    HYDROCODONE-ACETAMINOPHEN (NORCO) 5-325 MG PER TABLET    Take 1 tablet by mouth every 6 hours as needed for Pain for up to 3 days. Intended supply: 3 days. Take lowest dose possible to manage pain Max Daily Amount: 4 tablets       (Please note that portions of this note were completed with a voice recognition program.  Efforts were made to edit the dictations but occasionally words are mis-transcribed.)    DO Kerwin SANTOS John T, DO  04/06/24 2019

## 2024-04-06 NOTE — ED TRIAGE NOTES
Patient presents to ED via intake due to ankle injury after rolling ankle in 3 legged race. Complains of ankle and foot pain. Rating pain 10/10. Ice pack applied to area. Family at bedside. VSS.

## 2024-04-07 NOTE — DISCHARGE INSTRUCTIONS
Patient has what appears to be a sprain of her ankle.  Patient is instructed to wear the ankle brace and use crutches as needed.  She is instructed to advance trying to walk on this as tolerated.  She is instructed to use ice on this no more than 20 minutes at a time no more than 5 times a day for the next 72 hours and then she can switch to heat.  Patient is instructed to follow-up with either the podiatrist or orthopedic [place] for further evaluation.  She is also instructed to follow-up with primary care physician.  She is instructed to return to the emergency room immediately for any new or worsening complaints.

## 2024-10-07 ENCOUNTER — TELEPHONE (OUTPATIENT)
Dept: FAMILY MEDICINE CLINIC | Age: 54
End: 2024-10-07

## 2024-10-07 NOTE — TELEPHONE ENCOUNTER
----- Message from Dr. Bell Erickson MD sent at 10/6/2024  3:16 PM EDT -----  Please notify the patient that her community screening labs show that her cholesterol is worse than previous.  Total cholesterol is 303 and LDL is 223.  HDL is good at 56.  10-year cardiovascular risk score is at 2.5%.  Have her work on a low fat diet and regular physical activity to help with this.  The remainder of Her lab testing is normal.  CG

## 2024-10-30 ASSESSMENT — PATIENT HEALTH QUESTIONNAIRE - PHQ9
SUM OF ALL RESPONSES TO PHQ QUESTIONS 1-9: 0
SUM OF ALL RESPONSES TO PHQ QUESTIONS 1-9: 0
1. LITTLE INTEREST OR PLEASURE IN DOING THINGS: NOT AT ALL
2. FEELING DOWN, DEPRESSED OR HOPELESS: NOT AT ALL
SUM OF ALL RESPONSES TO PHQ QUESTIONS 1-9: 0
SUM OF ALL RESPONSES TO PHQ QUESTIONS 1-9: 0
SUM OF ALL RESPONSES TO PHQ9 QUESTIONS 1 & 2: 0

## 2024-11-04 ASSESSMENT — PATIENT HEALTH QUESTIONNAIRE - PHQ9
SUM OF ALL RESPONSES TO PHQ9 QUESTIONS 1 & 2: 0
1. LITTLE INTEREST OR PLEASURE IN DOING THINGS: NOT AT ALL
2. FEELING DOWN, DEPRESSED OR HOPELESS: NOT AT ALL

## 2024-11-22 ENCOUNTER — OFFICE VISIT (OUTPATIENT)
Dept: FAMILY MEDICINE CLINIC | Age: 54
End: 2024-11-22
Payer: COMMERCIAL

## 2024-11-22 VITALS
HEIGHT: 66 IN | WEIGHT: 149.6 LBS | BODY MASS INDEX: 24.04 KG/M2 | TEMPERATURE: 98.7 F | HEART RATE: 78 BPM | SYSTOLIC BLOOD PRESSURE: 120 MMHG | RESPIRATION RATE: 16 BRPM | OXYGEN SATURATION: 97 % | DIASTOLIC BLOOD PRESSURE: 70 MMHG

## 2024-11-22 DIAGNOSIS — Z00.00 ANNUAL PHYSICAL EXAM: Primary | ICD-10-CM

## 2024-11-22 DIAGNOSIS — Z12.31 ENCOUNTER FOR SCREENING MAMMOGRAM FOR MALIGNANT NEOPLASM OF BREAST: ICD-10-CM

## 2024-11-22 DIAGNOSIS — E78.49 OTHER HYPERLIPIDEMIA: ICD-10-CM

## 2024-11-22 PROCEDURE — 99396 PREV VISIT EST AGE 40-64: CPT | Performed by: FAMILY MEDICINE

## 2024-11-22 PROCEDURE — G8484 FLU IMMUNIZE NO ADMIN: HCPCS | Performed by: FAMILY MEDICINE

## 2024-11-22 SDOH — ECONOMIC STABILITY: FOOD INSECURITY: WITHIN THE PAST 12 MONTHS, THE FOOD YOU BOUGHT JUST DIDN'T LAST AND YOU DIDN'T HAVE MONEY TO GET MORE.: NEVER TRUE

## 2024-11-22 SDOH — ECONOMIC STABILITY: FOOD INSECURITY: WITHIN THE PAST 12 MONTHS, YOU WORRIED THAT YOUR FOOD WOULD RUN OUT BEFORE YOU GOT MONEY TO BUY MORE.: NEVER TRUE

## 2024-11-22 SDOH — ECONOMIC STABILITY: INCOME INSECURITY: HOW HARD IS IT FOR YOU TO PAY FOR THE VERY BASICS LIKE FOOD, HOUSING, MEDICAL CARE, AND HEATING?: NOT HARD AT ALL

## 2024-11-22 NOTE — PROGRESS NOTES
32 mEq/L 24    Anion Gap      4 - 12  8    Glucose      70 - 110 mg/dL 77    BUN,BUNPL      7 - 20 mg/dL 18    Creatinine      0.60 - 1.30 mg/dL 0.69    Creatinine Clearance >60    AST      15 - 41 IU/L 16    Alkaline Phosphatase      39 - 118 IU/L 94    Total Bilirubin      0.2 - 1.0 mg/dL 0.5    Calcium      8.8 - 10.5 mg/dL 9.20    Albumin      3.5 - 5.0 g/dL 4.3    Total Protein      6.2 - 8.0 g/dL 7.5    Albumin/Globulin Ratio      1.5 - 2.5  1.3 (L)    ALT      10 - 40 IU/L 9 (L)    Cholesterol, Total      <200 mg/dL 303 (H)    Triglycerides      <150 mg/dL 121    HDL Cholesterol      mg/dL 56    CHOLESTEROL/HDL RELATIVE RISK      4.0 - 4.4  5.4 (H)    LDL/HDL ASSESSMENT      <3.1  4.0 (H)    LDL Cholesterol      mg/dL 223 (H)    VLDL      <39 mg/dL 24    Hemoglobin A1C      4.4 - 6.4 % 5.6    eAG (mg/dL)      mg/dL 114    Vit D, 25-Hydroxy      30.00 - 100.00 ng/mL 49.00    Bilirubin, Direct      0.1 - 0.2 mg/dL 0.1    Phosphorus      2.4 - 4.7 mg/dL 3.7    Iron      28 - 170 mcg/dL 92    TSH, 3rd Generation      0.490 - 4.670 mcIU/mL 2.873       Legend:  (L) Low  (H) High    The 10-year ASCVD risk score (Marva BROWER, et al., 2019) is: 2.7%    Values used to calculate the score:      Age: 53 years      Sex: Female      Is Non- : No      Diabetic: No      Tobacco smoker: No      Systolic Blood Pressure: 120 mmHg      Is BP treated: No      HDL Cholesterol: 56 mg/dL      Total Cholesterol: 303 mg/dL    Immunization History   Administered Date(s) Administered    Influenza Virus Vaccine 10/02/2014, 10/08/2015, 10/01/2016, 10/01/2017, 10/04/2018, 10/21/2019, 10/08/2020, 12/29/2021    Influenza Whole 10/02/2014, 10/08/2015, 10/01/2016    TDaP, ADACEL (age 10y-64y), BOOSTRIX (age 10y+), IM, 0.5mL 09/06/2012, 10/31/2023       Health Maintenance   Topic Date Due    Shingles vaccine (1 of 2) Never done    Flu vaccine (1) 08/01/2024    COVID-19 Vaccine (1 - 2023-24 season) Never done    Breast

## 2024-11-22 NOTE — ASSESSMENT & PLAN NOTE
Chronic, not at goal (unstable), changes made today: patient will consider red yeast rice and fish oil supplements along with a lowfat diet and regular aerobic exercise.

## 2024-12-17 ENCOUNTER — HOSPITAL ENCOUNTER (OUTPATIENT)
Dept: MAMMOGRAPHY | Age: 54
Discharge: HOME | End: 2024-12-17
Attending: FAMILY MEDICINE
Payer: COMMERCIAL

## 2024-12-17 DIAGNOSIS — Z12.31 ENCOUNTER FOR SCREENING MAMMOGRAM FOR MALIGNANT NEOPLASM OF BREAST: ICD-10-CM

## 2024-12-17 PROCEDURE — 77063 BREAST TOMOSYNTHESIS BI: CPT

## 2025-01-09 ENCOUNTER — HOSPITAL ENCOUNTER (OUTPATIENT)
Dept: WOMENS IMAGING | Age: 55
Discharge: HOME | End: 2025-01-09
Attending: RADIOLOGY
Payer: COMMERCIAL

## 2025-01-09 DIAGNOSIS — R92.8 ABNORMAL MAMMOGRAM: ICD-10-CM

## 2025-01-09 PROCEDURE — 76642 ULTRASOUND BREAST LIMITED: CPT

## 2025-01-09 PROCEDURE — G0279 TOMOSYNTHESIS, MAMMO: HCPCS

## 2025-03-03 ENCOUNTER — OFFICE VISIT (OUTPATIENT)
Dept: FAMILY MEDICINE CLINIC | Age: 55
End: 2025-03-03
Payer: COMMERCIAL

## 2025-03-03 VITALS
WEIGHT: 153.2 LBS | BODY MASS INDEX: 24.73 KG/M2 | DIASTOLIC BLOOD PRESSURE: 68 MMHG | TEMPERATURE: 97.7 F | RESPIRATION RATE: 16 BRPM | SYSTOLIC BLOOD PRESSURE: 116 MMHG | HEART RATE: 72 BPM

## 2025-03-03 DIAGNOSIS — J01.40 ACUTE PANSINUSITIS, RECURRENCE NOT SPECIFIED: Primary | ICD-10-CM

## 2025-03-03 PROCEDURE — 99213 OFFICE O/P EST LOW 20 MIN: CPT | Performed by: FAMILY MEDICINE

## 2025-03-03 PROCEDURE — G8427 DOCREV CUR MEDS BY ELIG CLIN: HCPCS | Performed by: FAMILY MEDICINE

## 2025-03-03 PROCEDURE — G8420 CALC BMI NORM PARAMETERS: HCPCS | Performed by: FAMILY MEDICINE

## 2025-03-03 PROCEDURE — 1036F TOBACCO NON-USER: CPT | Performed by: FAMILY MEDICINE

## 2025-03-03 PROCEDURE — 3017F COLORECTAL CA SCREEN DOC REV: CPT | Performed by: FAMILY MEDICINE

## 2025-03-03 PROCEDURE — G2211 COMPLEX E/M VISIT ADD ON: HCPCS | Performed by: FAMILY MEDICINE

## 2025-03-03 RX ORDER — CEFDINIR 300 MG/1
300 CAPSULE ORAL 2 TIMES DAILY
Qty: 20 CAPSULE | Refills: 0 | Status: SHIPPED | OUTPATIENT
Start: 2025-03-03 | End: 2025-03-13

## 2025-03-03 SDOH — ECONOMIC STABILITY: FOOD INSECURITY: WITHIN THE PAST 12 MONTHS, YOU WORRIED THAT YOUR FOOD WOULD RUN OUT BEFORE YOU GOT MONEY TO BUY MORE.: NEVER TRUE

## 2025-03-03 SDOH — ECONOMIC STABILITY: FOOD INSECURITY: WITHIN THE PAST 12 MONTHS, THE FOOD YOU BOUGHT JUST DIDN'T LAST AND YOU DIDN'T HAVE MONEY TO GET MORE.: NEVER TRUE

## 2025-03-03 ASSESSMENT — ENCOUNTER SYMPTOMS
COUGH: 1
NAUSEA: 0
DIARRHEA: 0
RHINORRHEA: 1
SINUS PAIN: 1
VOMITING: 0
SHORTNESS OF BREATH: 0
SINUS PRESSURE: 1
SORE THROAT: 1
WHEEZING: 0

## 2025-03-03 ASSESSMENT — PATIENT HEALTH QUESTIONNAIRE - PHQ9
SUM OF ALL RESPONSES TO PHQ QUESTIONS 1-9: 0
SUM OF ALL RESPONSES TO PHQ QUESTIONS 1-9: 0
1. LITTLE INTEREST OR PLEASURE IN DOING THINGS: NOT AT ALL
SUM OF ALL RESPONSES TO PHQ QUESTIONS 1-9: 0
2. FEELING DOWN, DEPRESSED OR HOPELESS: NOT AT ALL
SUM OF ALL RESPONSES TO PHQ QUESTIONS 1-9: 0

## 2025-03-03 NOTE — PROGRESS NOTES
Known Allergies    Vitals:    03/03/25 0906   BP: 116/68   Pulse: 72   Resp: 16   Temp: 97.7 °F (36.5 °C)   TempSrc: Oral   Weight: 69.5 kg (153 lb 3.2 oz)       Wt Readings from Last 3 Encounters:   03/03/25 69.5 kg (153 lb 3.2 oz)   11/22/24 67.9 kg (149 lb 9.6 oz)   12/12/23 65.3 kg (143 lb 15.4 oz)       BP Readings from Last 3 Encounters:   03/03/25 116/68   11/22/24 120/70   04/06/24 117/62       Physical Exam  Vitals reviewed.   Constitutional:       General: She is not in acute distress.     Appearance: She is well-developed.   HENT:      Head: Normocephalic and atraumatic.      Right Ear: A middle ear effusion is present.      Left Ear: A middle ear effusion is present.      Nose: Congestion present.      Right Sinus: Maxillary sinus tenderness and frontal sinus tenderness present.      Left Sinus: Maxillary sinus tenderness and frontal sinus tenderness present.      Mouth/Throat:      Mouth: Mucous membranes are moist.      Pharynx: Posterior oropharyngeal erythema present. No oropharyngeal exudate.   Eyes:      Conjunctiva/sclera: Conjunctivae normal.   Cardiovascular:      Rate and Rhythm: Normal rate and regular rhythm.      Heart sounds: No murmur heard.  Pulmonary:      Breath sounds: Normal breath sounds. No wheezing.   Musculoskeletal:      Right lower leg: No edema.      Left lower leg: No edema.   Lymphadenopathy:      Cervical: No cervical adenopathy.   Neurological:      Mental Status: She is alert.             (Please note that portions of this note were completed with a voice recognition program. Efforts were made to edit the dictation but occasionally words are mis-transcribed.)     An electronic signature was used to authenticate this note.        Electronically signed by Bell Erickson MD on 3/3/2025 at 10:54 AM

## 2025-05-30 ENCOUNTER — TRANSCRIBE ORDERS (OUTPATIENT)
Dept: ADMINISTRATIVE | Age: 55
End: 2025-05-30

## 2025-05-30 DIAGNOSIS — Z12.31 ENCOUNTER FOR SCREENING MAMMOGRAM FOR MALIGNANT NEOPLASM OF BREAST: Primary | ICD-10-CM

## (undated) DEVICE — Device

## (undated) DEVICE — GOWN,SIRUS,NON REINFRCD,LARGE,SET IN SL: Brand: MEDLINE

## (undated) DEVICE — PAD,SANITARY,11 IN,MAXI,W/WINGS,N-STRL: Brand: MEDLINE

## (undated) DEVICE — DUAL LUMEN URETERAL CATHETER

## (undated) DEVICE — MEDI-VAC NON-CONDUCTIVE SUCTION TUBING: Brand: CARDINAL HEALTH

## (undated) DEVICE — CYSTO/BLADDER IRRIGATION SET, REGULATING CLAMP

## (undated) DEVICE — SOLUTION IV 1000ML 0.9% SOD CHL PH 5 INJ USP VIAFLX PLAS

## (undated) DEVICE — GARMENT COMPR STD FOR 17IN CALF UNIF THER FLOTRN

## (undated) DEVICE — DRAPE,UNDERBUTTOCKS,PCH,STERILE: Brand: MEDLINE

## (undated) DEVICE — GLOVE SURG SZ 65 THK91MIL LTX FREE SYN POLYISOPRENE

## (undated) DEVICE — OCCLUSION BALLOON CATHETER: Brand: OCCLUDER

## (undated) DEVICE — Z INACTIVE USE 2660664 SOLUTION IRRIG 3000ML 0.9% SOD CHL USP UROMATIC PLAS CONT

## (undated) DEVICE — GLOVE ORANGE PI 7 1/2   MSG9075

## (undated) DEVICE — Z DISCONTINUED NO SUB IDED KIT ENDOMET ABLAT IMPED CTRL DEV W/ RF CTRL FTSWCH SUCT LN

## (undated) DEVICE — GOWN,SIRUS,NONRNF,SETINSLV,XL,20/CS: Brand: MEDLINE

## (undated) DEVICE — HANDPIECE ABLAT DISP FOR ENDOMET SYS

## (undated) DEVICE — ADAPTER URO SCP UROLOK LL

## (undated) DEVICE — PAD,NON-ADHERENT,3X8,STERILE,LF,1/PK: Brand: MEDLINE

## (undated) DEVICE — SOLUTION SCRB 4OZ 4% CHG H2O AIDED FOR PREOPERATIVE SKIN

## (undated) DEVICE — CATHETER URETH 16FR BLLN 5CC SIL ALLY W/ SIL HYDRGEL 2 W F

## (undated) DEVICE — SINGLE ACTION PUMPING SYSTEM

## (undated) DEVICE — PACK,SET UP,NO DRAPES: Brand: MEDLINE

## (undated) DEVICE — TOWEL,OR,DSP,ST,BLUE,DLX,4/PK,20PK/CS: Brand: MEDLINE

## (undated) DEVICE — CYSTO PACK: Brand: MEDLINE INDUSTRIES, INC.

## (undated) DEVICE — TRAY PREP DRY W/ PREM GLV 2 APPL 6 SPNG 2 UNDPD 1 OVERWRAP

## (undated) DEVICE — GAUZE,SPONGE,8"X4",12PLY,XRAY,STRL,LF: Brand: MEDLINE

## (undated) DEVICE — BASIC SINGLE BASIN BTC-LF: Brand: MEDLINE INDUSTRIES, INC.

## (undated) DEVICE — GLOVE ORANGE PI 7   MSG9070

## (undated) DEVICE — SOLUTION IV IRRIG WATER 1000ML POUR BRL 2F7114

## (undated) DEVICE — RED RUBBER ROBINSON URETHRAL CATHETER, RADIOPAQUE, SMOOTH ROUNDED TIP, 14 FR (4.7 MM): Brand: DOVER

## (undated) DEVICE — GUIDEWIRE URO L150CM DIA0.035IN STIFF NIT HYDRPHLC STR TIP

## (undated) DEVICE — GAUZE SPONGES,12 PLY: Brand: CURITY

## (undated) DEVICE — SURE SET SINGLE BASIN-LF: Brand: MEDLINE INDUSTRIES, INC.